# Patient Record
Sex: MALE | Race: WHITE | NOT HISPANIC OR LATINO | Employment: OTHER | URBAN - METROPOLITAN AREA
[De-identification: names, ages, dates, MRNs, and addresses within clinical notes are randomized per-mention and may not be internally consistent; named-entity substitution may affect disease eponyms.]

---

## 2017-02-06 ENCOUNTER — OFFICE VISIT (OUTPATIENT)
Dept: NEUROLOGY | Facility: CLINIC | Age: 67
End: 2017-02-06

## 2017-02-06 VITALS
HEART RATE: 83 BPM | WEIGHT: 120 LBS | HEIGHT: 70 IN | OXYGEN SATURATION: 94 % | DIASTOLIC BLOOD PRESSURE: 64 MMHG | SYSTOLIC BLOOD PRESSURE: 116 MMHG | BODY MASS INDEX: 17.18 KG/M2

## 2017-02-06 DIAGNOSIS — R56.9 GENERALIZED CONVULSIVE SEIZURES (HCC): ICD-10-CM

## 2017-02-06 DIAGNOSIS — G40.209 PARTIAL SYMPTOMATIC EPILEPSY WITH COMPLEX PARTIAL SEIZURES, NOT INTRACTABLE, WITHOUT STATUS EPILEPTICUS (HCC): Primary | ICD-10-CM

## 2017-02-06 PROCEDURE — 99213 OFFICE O/P EST LOW 20 MIN: CPT | Performed by: PSYCHIATRY & NEUROLOGY

## 2017-02-06 RX ORDER — WARFARIN SODIUM 4 MG/1
TABLET ORAL
COMMUNITY
Start: 2017-02-03 | End: 2018-02-06

## 2017-02-06 RX ORDER — PHENYTOIN SODIUM 100 MG/1
CAPSULE, EXTENDED RELEASE ORAL
Qty: 120 CAPSULE | Refills: 11 | Status: SHIPPED | OUTPATIENT
Start: 2017-02-06 | End: 2018-02-06 | Stop reason: SDUPTHER

## 2017-02-06 RX ORDER — MESALAMINE 1.2 G/1
TABLET, DELAYED RELEASE ORAL
COMMUNITY
Start: 2017-01-03

## 2017-02-06 RX ORDER — LEVOTHYROXINE SODIUM 0.05 MG/1
TABLET ORAL DAILY
COMMUNITY
Start: 2014-10-13

## 2017-02-06 NOTE — PROGRESS NOTES
Subjective:     Patient ID: Dmitri Alberto is a 66 y.o. male.    History of Present Illness  The following portions of the patient's history were reviewed and updated as appropriate: allergies, current medications, past family history, past medical history, past social history, past surgical history and problem list.  Epilepsy: In 1989 the patient had a seizure of unknown cause. The MRI brain scan was normal on November 17, 1989. He was started on Dilantin and did well. In retrospect he thought the seizure may have been due to excess use of marijuana and alcohol both of which she has subsequently avoided.     1998: In 1998 a seizure resulted in a shoulder fracture and head contusion with negative CT.     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     His last seizure was in 2012 at which time he had stopped taking Dilantin because he has been seizure free for a decade. Now sz free on phenytoin alternating 300 and 400/  Qod. When he took 400 daily, his level was toxic.      He is under the care of a hematologist and now is on coumadin daily for hypercoag state. Last level, Feb 2015 was 7.5 and free was 0.6    LAST OV-He did not want to switch off Dilantin even after review of potential AE> Discussed interaction of phenytoin and warfarin.     THIS OV-  Will ask dr wetzel to check phenytoin level with next INR>   Review of Systems   Constitutional: Positive for diaphoresis (night). Negative for chills and fever.   HENT: Negative.    Eyes: Negative.    Respiratory: Negative.    Cardiovascular: Negative.    Gastrointestinal: Negative.    Endocrine: Negative.    Genitourinary: Negative.    Musculoskeletal: Negative.    Skin: Negative.    Allergic/Immunologic: Negative.    Neurological: Positive for seizures.   Hematological:  Bruises/bleeds easily.   Psychiatric/Behavioral: Positive for agitation and confusion.        Objective:    Neurologic Exam     Cranial Nerves     CN III, IV, VI   Pupils are equal, round, and reactive to light.  Extraocular motions are normal.     Constitutional   General appearance: No acute distress, well appearing and well nourished. Thin  Head and Face   Head and face: Normal, atraumatic.   Eyes   Ophthalmoscopic examination: Normal appearing optic disc and posterior segments.Recent bilat cataract OR>    Neck   Neck and thyroid: Normal, supple, trachea midline, no masses or thyromegaly.   Cardiovascular   Peripheral vascular exam: Normal pulses throughout, no tenderness, erythema or swelling.   Musculoskeletal   Gait and station: Normal gait, stance and balance.   Muscle strength: Normal strength throughout arms and legs.   Muscle tone: No atrophy, abnormal movements, flaccidity, cogwheeling or spasticity.   Neurologic   Orientation to person, place, and time: Normal.   Recent and remote memory: Demonstrates normal memory for serial sevens, oorientation, spelling WORLD forward and backward, and recall 4/5 after 3 min..   Attention span and concentration: Normal thought process and attention span.   Language: Names objects, able to repeat phrases and speaks spontaneously.   Fund of knowledge: Normal vocabulary with appropriate knowledge of current events and past history.   Optic nerve: Normal.   3rd, 4th, and 6th cranial nerves: Normal.   5th cranial nerve: defer  7th cranial nerve: Normal.   8th cranial nerve: Normal.   9th cranial nerve: Normal.   10th cranial nerve: Normal.   11th cranial nerve: Normal.   12th cranial nerve: Normal.   Sensation: Normal. -vibration x4,   Reflexes: Normal in arms and legs. Grade 1 Coordination: Normal. F-N H-S  Cortical function: Normal.   Judgment and insight: Normal.   Mood and affect: Normal.   Physical Exam   Constitutional: He appears well-developed and well-nourished.    HENT:   Head: Normocephalic.   Eyes: EOM are normal. Pupils are equal, round, and reactive to light.   Neck: Normal range of motion.   Pulmonary/Chest: Effort normal.   Psychiatric: He has a normal mood and affect. His behavior is normal. Judgment and thought content normal.   Nursing note and vitals reviewed.      Assessment/Plan:       Problems Addressed this Visit        Unprioritized    Partial symptomatic epilepsy with complex partial seizures, not intractable, without status epilepticus - Primary             Doing well: No seizures, with low levels--so no change in dose.    Check phenytoin level with dr. Moody.  F/u one year.  Did not want to change meds as no AE. The memory sz after TBI after resolved.

## 2017-08-09 ENCOUNTER — OFFICE (AMBULATORY)
Dept: URBAN - METROPOLITAN AREA CLINIC 75 | Facility: CLINIC | Age: 67
End: 2017-08-09

## 2017-08-09 VITALS
SYSTOLIC BLOOD PRESSURE: 104 MMHG | HEART RATE: 62 BPM | DIASTOLIC BLOOD PRESSURE: 58 MMHG | HEIGHT: 70 IN | WEIGHT: 115 LBS

## 2017-08-09 DIAGNOSIS — K51.90 ULCERATIVE COLITIS, UNSPECIFIED, WITHOUT COMPLICATIONS: ICD-10-CM

## 2017-08-09 DIAGNOSIS — R63.4 ABNORMAL WEIGHT LOSS: ICD-10-CM

## 2017-08-09 PROCEDURE — 99213 OFFICE O/P EST LOW 20 MIN: CPT | Performed by: INTERNAL MEDICINE

## 2017-08-31 VITALS
SYSTOLIC BLOOD PRESSURE: 121 MMHG | SYSTOLIC BLOOD PRESSURE: 96 MMHG | SYSTOLIC BLOOD PRESSURE: 105 MMHG | OXYGEN SATURATION: 96 % | HEART RATE: 69 BPM | RESPIRATION RATE: 16 BRPM | HEART RATE: 63 BPM | DIASTOLIC BLOOD PRESSURE: 63 MMHG | TEMPERATURE: 97.7 F | HEART RATE: 68 BPM | WEIGHT: 115 LBS | RESPIRATION RATE: 48 BRPM | SYSTOLIC BLOOD PRESSURE: 101 MMHG | DIASTOLIC BLOOD PRESSURE: 71 MMHG | RESPIRATION RATE: 18 BRPM | DIASTOLIC BLOOD PRESSURE: 75 MMHG | DIASTOLIC BLOOD PRESSURE: 73 MMHG | SYSTOLIC BLOOD PRESSURE: 128 MMHG | HEART RATE: 78 BPM | RESPIRATION RATE: 24 BRPM | OXYGEN SATURATION: 95 % | SYSTOLIC BLOOD PRESSURE: 97 MMHG | RESPIRATION RATE: 14 BRPM | OXYGEN SATURATION: 97 % | HEART RATE: 74 BPM | RESPIRATION RATE: 13 BRPM | HEART RATE: 76 BPM | HEART RATE: 80 BPM | SYSTOLIC BLOOD PRESSURE: 103 MMHG | DIASTOLIC BLOOD PRESSURE: 74 MMHG | DIASTOLIC BLOOD PRESSURE: 66 MMHG | SYSTOLIC BLOOD PRESSURE: 114 MMHG | SYSTOLIC BLOOD PRESSURE: 102 MMHG | HEART RATE: 75 BPM | SYSTOLIC BLOOD PRESSURE: 117 MMHG | HEART RATE: 77 BPM | HEART RATE: 72 BPM | DIASTOLIC BLOOD PRESSURE: 64 MMHG | HEIGHT: 70 IN | SYSTOLIC BLOOD PRESSURE: 111 MMHG | RESPIRATION RATE: 15 BRPM | DIASTOLIC BLOOD PRESSURE: 65 MMHG | SYSTOLIC BLOOD PRESSURE: 98 MMHG | OXYGEN SATURATION: 94 %

## 2017-09-05 ENCOUNTER — AMBULATORY SURGICAL CENTER (AMBULATORY)
Dept: URBAN - METROPOLITAN AREA SURGERY 17 | Facility: SURGERY | Age: 67
End: 2017-09-05

## 2017-09-05 ENCOUNTER — OFFICE (AMBULATORY)
Dept: URBAN - METROPOLITAN AREA CLINIC 64 | Facility: CLINIC | Age: 67
End: 2017-09-05

## 2017-09-05 DIAGNOSIS — K20.9 ESOPHAGITIS, UNSPECIFIED: ICD-10-CM

## 2017-09-05 DIAGNOSIS — K51.90 ULCERATIVE COLITIS, UNSPECIFIED, WITHOUT COMPLICATIONS: ICD-10-CM

## 2017-09-05 DIAGNOSIS — K57.30 DIVERTICULOSIS OF LARGE INTESTINE WITHOUT PERFORATION OR ABS: ICD-10-CM

## 2017-09-05 DIAGNOSIS — K29.50 UNSPECIFIED CHRONIC GASTRITIS WITHOUT BLEEDING: ICD-10-CM

## 2017-09-05 DIAGNOSIS — K44.9 DIAPHRAGMATIC HERNIA WITHOUT OBSTRUCTION OR GANGRENE: ICD-10-CM

## 2017-09-05 DIAGNOSIS — K22.2 ESOPHAGEAL OBSTRUCTION: ICD-10-CM

## 2017-09-05 DIAGNOSIS — K52.9 NONINFECTIVE GASTROENTERITIS AND COLITIS, UNSPECIFIED: ICD-10-CM

## 2017-09-05 DIAGNOSIS — K22.70 BARRETT'S ESOPHAGUS WITHOUT DYSPLASIA: ICD-10-CM

## 2017-09-05 DIAGNOSIS — R63.4 ABNORMAL WEIGHT LOSS: ICD-10-CM

## 2017-09-05 LAB
GI HISTOLOGY: A. UNSPECIFIED: (no result)
GI HISTOLOGY: B. UNSPECIFIED: (no result)
GI HISTOLOGY: C. SELECT: (no result)
GI HISTOLOGY: D. UNSPECIFIED: (no result)
GI HISTOLOGY: E. SELECT: (no result)
GI HISTOLOGY: F. UNSPECIFIED: (no result)
GI HISTOLOGY: PDF REPORT: (no result)

## 2017-09-05 PROCEDURE — 88305 TISSUE EXAM BY PATHOLOGIST: CPT | Performed by: INTERNAL MEDICINE

## 2017-09-05 PROCEDURE — 45380 COLONOSCOPY AND BIOPSY: CPT | Mod: PT | Performed by: INTERNAL MEDICINE

## 2017-09-05 PROCEDURE — 43239 EGD BIOPSY SINGLE/MULTIPLE: CPT | Mod: 59 | Performed by: INTERNAL MEDICINE

## 2017-09-05 RX ADMIN — PROPOFOL 50 MG: 10 INJECTION, EMULSION INTRAVENOUS at 13:24

## 2017-09-05 RX ADMIN — PROPOFOL 50 MG: 10 INJECTION, EMULSION INTRAVENOUS at 13:10

## 2017-09-05 RX ADMIN — PROPOFOL 100 MG: 10 INJECTION, EMULSION INTRAVENOUS at 13:08

## 2017-09-05 RX ADMIN — PROPOFOL 50 MG: 10 INJECTION, EMULSION INTRAVENOUS at 13:30

## 2017-09-05 RX ADMIN — PROPOFOL 50 MG: 10 INJECTION, EMULSION INTRAVENOUS at 13:26

## 2017-10-17 ENCOUNTER — OFFICE (AMBULATORY)
Dept: URBAN - METROPOLITAN AREA CLINIC 75 | Facility: CLINIC | Age: 67
End: 2017-10-17

## 2017-10-17 VITALS
WEIGHT: 114 LBS | HEIGHT: 70 IN | HEART RATE: 72 BPM | SYSTOLIC BLOOD PRESSURE: 122 MMHG | DIASTOLIC BLOOD PRESSURE: 68 MMHG

## 2017-10-17 DIAGNOSIS — K51.90 ULCERATIVE COLITIS, UNSPECIFIED, WITHOUT COMPLICATIONS: ICD-10-CM

## 2017-10-17 DIAGNOSIS — K22.70 BARRETT'S ESOPHAGUS WITHOUT DYSPLASIA: ICD-10-CM

## 2017-10-17 PROCEDURE — 99213 OFFICE O/P EST LOW 20 MIN: CPT | Performed by: INTERNAL MEDICINE

## 2018-02-06 ENCOUNTER — OFFICE VISIT (OUTPATIENT)
Dept: NEUROLOGY | Facility: CLINIC | Age: 68
End: 2018-02-06

## 2018-02-06 VITALS
SYSTOLIC BLOOD PRESSURE: 104 MMHG | HEART RATE: 77 BPM | BODY MASS INDEX: 16.66 KG/M2 | HEIGHT: 70 IN | WEIGHT: 116.4 LBS | DIASTOLIC BLOOD PRESSURE: 64 MMHG | OXYGEN SATURATION: 95 %

## 2018-02-06 DIAGNOSIS — R41.89 ALTERATION IN COGNITION: ICD-10-CM

## 2018-02-06 DIAGNOSIS — T50.905D ADVERSE EFFECT OF DRUG, SUBSEQUENT ENCOUNTER: ICD-10-CM

## 2018-02-06 DIAGNOSIS — R56.9 GENERALIZED CONVULSIVE SEIZURES (HCC): ICD-10-CM

## 2018-02-06 DIAGNOSIS — G40.209 PARTIAL SYMPTOMATIC EPILEPSY WITH COMPLEX PARTIAL SEIZURES, NOT INTRACTABLE, WITHOUT STATUS EPILEPTICUS (HCC): Primary | ICD-10-CM

## 2018-02-06 PROCEDURE — 99214 OFFICE O/P EST MOD 30 MIN: CPT | Performed by: PSYCHIATRY & NEUROLOGY

## 2018-02-06 RX ORDER — HYDROXYZINE HYDROCHLORIDE 25 MG/1
25 TABLET, FILM COATED ORAL
COMMUNITY
Start: 2017-04-25

## 2018-02-06 RX ORDER — PHENYTOIN SODIUM 100 MG/1
CAPSULE, EXTENDED RELEASE ORAL
Qty: 120 CAPSULE | Refills: 11 | Status: SHIPPED | OUTPATIENT
Start: 2018-02-06 | End: 2019-02-05 | Stop reason: SDUPTHER

## 2018-02-06 RX ORDER — HYDROMORPHONE HYDROCHLORIDE 2 MG/1
TABLET ORAL
COMMUNITY
Start: 2017-11-14 | End: 2018-02-06

## 2018-02-06 RX ORDER — CEPHALEXIN 500 MG/1
CAPSULE ORAL
COMMUNITY
Start: 2017-11-30 | End: 2018-02-06

## 2018-02-06 RX ORDER — MESALAMINE 400 MG/1
400 CAPSULE, DELAYED RELEASE ORAL
COMMUNITY
End: 2018-02-06

## 2018-02-06 RX ORDER — OXYCODONE AND ACETAMINOPHEN 10; 325 MG/1; MG/1
TABLET ORAL
COMMUNITY
Start: 2018-01-05 | End: 2018-02-06

## 2018-02-06 RX ORDER — HYDROCODONE BITARTRATE AND ACETAMINOPHEN 5; 325 MG/1; MG/1
TABLET ORAL
COMMUNITY
Start: 2017-11-17 | End: 2018-02-06

## 2018-02-06 RX ORDER — PHENYTOIN 50 MG/1
100 TABLET, CHEWABLE ORAL
Status: ON HOLD | COMMUNITY
End: 2018-07-30

## 2018-02-06 RX ORDER — HYDROCODONE BITARTRATE AND ACETAMINOPHEN 7.5; 325 MG/1; MG/1
TABLET ORAL
COMMUNITY
Start: 2017-11-30 | End: 2018-02-06

## 2018-02-06 NOTE — PROGRESS NOTES
Subjective:     Patient ID: Dmitri Alberto is a 67 y.o. male.    History of Present Illness  The following portions of the patient's history were reviewed and updated as appropriate: allergies, current medications, past family history, past medical history, past social history, past surgical history and problem list.  EPILEPSY- problem list.  Epilepsy: In 1989 the patient had a seizure of unknown cause. The MRI brain scan was normal on November 17, 1989. He was started on Dilantin and did well. In retrospect he thought the seizure may have been due to excess use of marijuana and alcohol both of which she has subsequently avoided.      1998: In 1998 a seizure resulted in a shoulder fracture and head contusion with negative CT.      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      His last seizure was in 2012 at which time he had stopped taking Dilantin because he has been seizure free for a decade. Now sz free on phenytoin alternating 300 and 400/  Qod. When he took 400 daily, his level was toxic.     On 11/21/17 lab review shows dilantion level is 10.5     DVT hx- no longer on warfarin b/c of bleeding episode.  Interaction with phenytoin no longer an issue    Cognitive change-his wife has noted that Mr. Alberto has been confused as to the date and office appointments..  No headache blurred vision stroke.  Symptoms of slow onset.  None of this reported to the PCP in the past        Adverse Effects- was on shots for bone health.  Now on Mg and calcium and isma d 3.   Review of Systems   Constitutional: Negative for activity change, appetite change and fatigue.   HENT: Negative for ear pain, facial swelling and trouble swallowing.    Eyes: Negative for photophobia, pain and visual disturbance.   Respiratory: Negative for  choking, chest tightness and shortness of breath.    Cardiovascular: Negative for chest pain, palpitations and leg swelling.   Gastrointestinal: Negative for abdominal pain, constipation and nausea.   Endocrine: Negative for polydipsia, polyphagia and polyuria.   Genitourinary: Positive for frequency. Negative for urgency.   Musculoskeletal: Negative for back pain, gait problem and neck pain.   Skin: Negative for color change, rash and wound.   Allergic/Immunologic: Negative for environmental allergies, food allergies and immunocompromised state.   Neurological: Negative for dizziness, tremors, seizures, syncope, facial asymmetry, speech difficulty, weakness, light-headedness, numbness and headaches.   Hematological: Negative for adenopathy. Does not bruise/bleed easily.   Psychiatric/Behavioral: Negative for agitation, behavioral problems, confusion, decreased concentration, dysphoric mood, hallucinations, self-injury, sleep disturbance and suicidal ideas. The patient is not nervous/anxious and is not hyperactive.         Objective:    Neurologic Exam     Mental Status   Oriented to person, place, and time.   Oriented to time. Disoriented to day. Oriented to year.        [place] cognitive assessment-he scored 20 out of 30,  Losing points for short-term memory, repeating backwards language and orientation concerning date place and day.     Cranial Nerves   Cranial nerves II through XII intact.     CN III, IV, VI   Pupils are equal, round, and reactive to light.    Motor Exam   Muscle bulk: normal  Overall muscle tone: normal    Sensory Exam   Light touch normal.   Vibration normal.     Gait, Coordination, and Reflexes     Gait  Gait: normal    Coordination   Romberg: negative  Finger to nose coordination: normal    Tremor   Resting tremor: absent  Intention tremor: absent  Action tremor: absent    Reflexes   Right patellar: 2+  Left patellar: 2+  Right achilles: 2+  Left achilles: 2+       Physical Exam    Constitutional: He is oriented to person, place, and time. He appears well-developed and well-nourished.   Eyes: Pupils are equal, round, and reactive to light.   Neck: Normal range of motion. Neck supple.   Cardiovascular: Normal rate.    Pulmonary/Chest: Effort normal.   Neurological: He is oriented to person, place, and time. He has a normal Finger-Nose-Finger Test and a normal Romberg Test. Gait normal.   Reflex Scores:       Patellar reflexes are 2+ on the right side and 2+ on the left side.       Achilles reflexes are 2+ on the right side and 2+ on the left side.  Psychiatric: He has a normal mood and affect. His behavior is normal.   Abnormal thought content and judgment-see results of [place] testing   Nursing note and vitals reviewed.      Assessment/Plan:       Problems Addressed this Visit        Unprioritized    Adverse drug effect    Partial symptomatic epilepsy with complex partial seizures, not intractable, without status epilepticus - Primary    Relevant Medications    phenytoin (DILANTIN) 100 MG ER capsule    phenytoin (DILANTIN) 50 MG chewable tablet    Generalized convulsive seizures    Relevant Medications    phenytoin (DILANTIN) 100 MG ER capsule    Alteration in cognition    Relevant Orders    Ambulatory Referral to Neuropsychology (Completed)           Epilepsy control is good.  No change in medication at patient's request.  Blood level therapeutic a few months ago as noted.    Altered cognition-poor results on [place] testing.  Not sure if this is anxiety over testing or not so will have him get a neuropsychology test.    Adverse drug effect-discussed above and stable

## 2018-03-13 ENCOUNTER — TREATMENT (OUTPATIENT)
Dept: NEUROLOGY | Facility: CLINIC | Age: 68
End: 2018-03-13

## 2018-03-13 NOTE — PROGRESS NOTES
A review of the neuropsychology test report dated fibular 20/8/2018 concluded a diagnosis of frontotemporal degeneration, behavioral variant, mild he had borderline scores on semantic verbal fluency.  Visual memory was strong without deficit.    It is noted that cognitive decline could be related to past head injury but more likely the personality problems would be more consistent with FTD    He will look for evidence of possible frontal atrophy on brain MRI which has been scheduled.

## 2018-07-29 ENCOUNTER — HOSPITAL ENCOUNTER (INPATIENT)
Facility: HOSPITAL | Age: 68
LOS: 2 days | Discharge: HOME | End: 2018-08-01
Attending: EMERGENCY MEDICINE | Admitting: INTERNAL MEDICINE

## 2018-07-29 ENCOUNTER — APPOINTMENT (OUTPATIENT)
Dept: GENERAL RADIOLOGY | Facility: HOSPITAL | Age: 68
End: 2018-07-29

## 2018-07-29 DIAGNOSIS — L03.113 CELLULITIS OF RIGHT ELBOW: Primary | ICD-10-CM

## 2018-07-29 DIAGNOSIS — M25.421 EFFUSION OF RIGHT ELBOW: ICD-10-CM

## 2018-07-29 LAB
ANION GAP SERPL CALCULATED.3IONS-SCNC: 10.1 MMOL/L
BASOPHILS # BLD AUTO: 0.04 10*3/MM3 (ref 0–0.2)
BASOPHILS NFR BLD AUTO: 0.4 % (ref 0–1.5)
BUN BLD-MCNC: 21 MG/DL (ref 8–23)
BUN/CREAT SERPL: 22.8 (ref 7–25)
CALCIUM SPEC-SCNC: 9 MG/DL (ref 8.6–10.5)
CHLORIDE SERPL-SCNC: 98 MMOL/L (ref 98–107)
CO2 SERPL-SCNC: 31.9 MMOL/L (ref 22–29)
CREAT BLD-MCNC: 0.92 MG/DL (ref 0.76–1.27)
CRP SERPL-MCNC: 28.66 MG/DL (ref 0–0.5)
DEPRECATED RDW RBC AUTO: 47.4 FL (ref 37–54)
EOSINOPHIL # BLD AUTO: 0.13 10*3/MM3 (ref 0–0.7)
EOSINOPHIL NFR BLD AUTO: 1.4 % (ref 0.3–6.2)
ERYTHROCYTE [DISTWIDTH] IN BLOOD BY AUTOMATED COUNT: 12.9 % (ref 11.5–14.5)
ERYTHROCYTE [SEDIMENTATION RATE] IN BLOOD: 64 MM/HR (ref 0–20)
GFR SERPL CREATININE-BSD FRML MDRD: 82 ML/MIN/1.73
GLUCOSE BLD-MCNC: 151 MG/DL (ref 65–99)
HCT VFR BLD AUTO: 40.9 % (ref 40.4–52.2)
HGB BLD-MCNC: 13.3 G/DL (ref 13.7–17.6)
IMM GRANULOCYTES # BLD: 0.02 10*3/MM3 (ref 0–0.03)
IMM GRANULOCYTES NFR BLD: 0.2 % (ref 0–0.5)
LYMPHOCYTES # BLD AUTO: 1.5 10*3/MM3 (ref 0.9–4.8)
LYMPHOCYTES NFR BLD AUTO: 16.6 % (ref 19.6–45.3)
MCH RBC QN AUTO: 32.6 PG (ref 27–32.7)
MCHC RBC AUTO-ENTMCNC: 32.5 G/DL (ref 32.6–36.4)
MCV RBC AUTO: 100.2 FL (ref 79.8–96.2)
MONOCYTES # BLD AUTO: 1.05 10*3/MM3 (ref 0.2–1.2)
MONOCYTES NFR BLD AUTO: 11.6 % (ref 5–12)
NEUTROPHILS # BLD AUTO: 6.31 10*3/MM3 (ref 1.9–8.1)
NEUTROPHILS NFR BLD AUTO: 70 % (ref 42.7–76)
PLATELET # BLD AUTO: 256 10*3/MM3 (ref 140–500)
PMV BLD AUTO: 9.6 FL (ref 6–12)
POTASSIUM BLD-SCNC: 4.2 MMOL/L (ref 3.5–5.2)
RBC # BLD AUTO: 4.08 10*6/MM3 (ref 4.6–6)
SODIUM BLD-SCNC: 140 MMOL/L (ref 136–145)
WBC NRBC COR # BLD: 9.03 10*3/MM3 (ref 4.5–10.7)

## 2018-07-29 PROCEDURE — 99284 EMERGENCY DEPT VISIT MOD MDM: CPT

## 2018-07-29 PROCEDURE — 84550 ASSAY OF BLOOD/URIC ACID: CPT | Performed by: PHYSICIAN ASSISTANT

## 2018-07-29 PROCEDURE — 86140 C-REACTIVE PROTEIN: CPT | Performed by: PHYSICIAN ASSISTANT

## 2018-07-29 PROCEDURE — 85652 RBC SED RATE AUTOMATED: CPT | Performed by: PHYSICIAN ASSISTANT

## 2018-07-29 PROCEDURE — 80048 BASIC METABOLIC PNL TOTAL CA: CPT | Performed by: PHYSICIAN ASSISTANT

## 2018-07-29 PROCEDURE — 73070 X-RAY EXAM OF ELBOW: CPT

## 2018-07-29 PROCEDURE — 85025 COMPLETE CBC W/AUTO DIFF WBC: CPT | Performed by: PHYSICIAN ASSISTANT

## 2018-07-29 PROCEDURE — 87040 BLOOD CULTURE FOR BACTERIA: CPT | Performed by: PHYSICIAN ASSISTANT

## 2018-07-29 RX ORDER — OXYCODONE HYDROCHLORIDE AND ACETAMINOPHEN 5; 325 MG/1; MG/1
1 TABLET ORAL ONCE
Status: COMPLETED | OUTPATIENT
Start: 2018-07-29 | End: 2018-07-29

## 2018-07-29 RX ADMIN — OXYCODONE HYDROCHLORIDE AND ACETAMINOPHEN 1 TABLET: 5; 325 TABLET ORAL at 23:41

## 2018-07-30 ENCOUNTER — APPOINTMENT (OUTPATIENT)
Dept: MRI IMAGING | Facility: HOSPITAL | Age: 68
End: 2018-07-30
Attending: ORTHOPAEDIC SURGERY

## 2018-07-30 ENCOUNTER — APPOINTMENT (OUTPATIENT)
Dept: GENERAL RADIOLOGY | Facility: HOSPITAL | Age: 68
End: 2018-07-30
Attending: ORTHOPAEDIC SURGERY

## 2018-07-30 PROBLEM — E83.110 HEREDITARY HEMOCHROMATOSIS (HCC): Status: ACTIVE | Noted: 2018-07-30

## 2018-07-30 PROBLEM — E03.9 HYPOTHYROIDISM: Status: ACTIVE | Noted: 2018-07-30

## 2018-07-30 PROBLEM — L03.113 CELLULITIS OF RIGHT ELBOW: Status: ACTIVE | Noted: 2018-07-30

## 2018-07-30 LAB
ALBUMIN SERPL-MCNC: 3.1 G/DL (ref 3.5–5.2)
ALBUMIN/GLOB SERPL: 0.8 G/DL
ALP SERPL-CCNC: 59 U/L (ref 39–117)
ALT SERPL W P-5'-P-CCNC: 7 U/L (ref 1–41)
ANION GAP SERPL CALCULATED.3IONS-SCNC: 11.5 MMOL/L
APPEARANCE FLD: ABNORMAL
AST SERPL-CCNC: 12 U/L (ref 1–40)
BASOPHILS # BLD AUTO: 0.03 10*3/MM3 (ref 0–0.2)
BASOPHILS NFR BLD AUTO: 0.4 % (ref 0–1.5)
BILIRUB SERPL-MCNC: 0.3 MG/DL (ref 0.1–1.2)
BUN BLD-MCNC: 15 MG/DL (ref 8–23)
BUN/CREAT SERPL: 22.4 (ref 7–25)
CALCIUM SPEC-SCNC: 8.2 MG/DL (ref 8.6–10.5)
CHLORIDE SERPL-SCNC: 100 MMOL/L (ref 98–107)
CO2 SERPL-SCNC: 25.5 MMOL/L (ref 22–29)
COLOR FLD: YELLOW
CREAT BLD-MCNC: 0.67 MG/DL (ref 0.76–1.27)
CRYSTALS FLD MICRO: NORMAL
D-LACTATE SERPL-SCNC: 1.2 MMOL/L (ref 0.5–2)
DEPRECATED RDW RBC AUTO: 46.9 FL (ref 37–54)
EOSINOPHIL # BLD AUTO: 0.16 10*3/MM3 (ref 0–0.7)
EOSINOPHIL NFR BLD AUTO: 2.2 % (ref 0.3–6.2)
ERYTHROCYTE [DISTWIDTH] IN BLOOD BY AUTOMATED COUNT: 12.8 % (ref 11.5–14.5)
GFR SERPL CREATININE-BSD FRML MDRD: 118 ML/MIN/1.73
GLOBULIN UR ELPH-MCNC: 4.1 GM/DL
GLUCOSE BLD-MCNC: 107 MG/DL (ref 65–99)
HCT VFR BLD AUTO: 38.4 % (ref 40.4–52.2)
HGB BLD-MCNC: 12.1 G/DL (ref 13.7–17.6)
IMM GRANULOCYTES # BLD: 0.02 10*3/MM3 (ref 0–0.03)
IMM GRANULOCYTES NFR BLD: 0.3 % (ref 0–0.5)
LYMPHOCYTES # BLD AUTO: 1.55 10*3/MM3 (ref 0.9–4.8)
LYMPHOCYTES NFR BLD AUTO: 21 % (ref 19.6–45.3)
LYMPHOCYTES NFR FLD MANUAL: 7 %
MCH RBC QN AUTO: 31.6 PG (ref 27–32.7)
MCHC RBC AUTO-ENTMCNC: 31.5 G/DL (ref 32.6–36.4)
MCV RBC AUTO: 100.3 FL (ref 79.8–96.2)
MONOCYTES # BLD AUTO: 1.05 10*3/MM3 (ref 0.2–1.2)
MONOCYTES NFR BLD AUTO: 14.2 % (ref 5–12)
NEUTROPHILS # BLD AUTO: 4.57 10*3/MM3 (ref 1.9–8.1)
NEUTROPHILS NFR BLD AUTO: 61.9 % (ref 42.7–76)
NEUTROPHILS NFR FLD MANUAL: 93 %
PLATELET # BLD AUTO: 226 10*3/MM3 (ref 140–500)
PMV BLD AUTO: 9.6 FL (ref 6–12)
POTASSIUM BLD-SCNC: 3.6 MMOL/L (ref 3.5–5.2)
PROT SERPL-MCNC: 7.2 G/DL (ref 6–8.5)
RBC # BLD AUTO: 3.83 10*6/MM3 (ref 4.6–6)
RBC # FLD AUTO: 72 /MM3
SODIUM BLD-SCNC: 137 MMOL/L (ref 136–145)
URATE SERPL-MCNC: 2.8 MG/DL (ref 3.4–7)
WBC # FLD: ABNORMAL /MM3
WBC NRBC COR # BLD: 7.38 10*3/MM3 (ref 4.5–10.7)

## 2018-07-30 PROCEDURE — 99222 1ST HOSP IP/OBS MODERATE 55: CPT | Performed by: ORTHOPAEDIC SURGERY

## 2018-07-30 PROCEDURE — 0 GADOBENATE DIMEGLUMINE 529 MG/ML SOLUTION: Performed by: INTERNAL MEDICINE

## 2018-07-30 PROCEDURE — 89060 EXAM SYNOVIAL FLUID CRYSTALS: CPT | Performed by: ORTHOPAEDIC SURGERY

## 2018-07-30 PROCEDURE — A9577 INJ MULTIHANCE: HCPCS | Performed by: INTERNAL MEDICINE

## 2018-07-30 PROCEDURE — 77002 NEEDLE LOCALIZATION BY XRAY: CPT

## 2018-07-30 PROCEDURE — 25010000002 VANCOMYCIN PER 500 MG: Performed by: NURSE PRACTITIONER

## 2018-07-30 PROCEDURE — 87205 SMEAR GRAM STAIN: CPT | Performed by: ORTHOPAEDIC SURGERY

## 2018-07-30 PROCEDURE — 85025 COMPLETE CBC W/AUTO DIFF WBC: CPT | Performed by: NURSE PRACTITIONER

## 2018-07-30 PROCEDURE — 99232 SBSQ HOSP IP/OBS MODERATE 35: CPT | Performed by: ORTHOPAEDIC SURGERY

## 2018-07-30 PROCEDURE — 83605 ASSAY OF LACTIC ACID: CPT | Performed by: PHYSICIAN ASSISTANT

## 2018-07-30 PROCEDURE — 25010000002 VANCOMYCIN 750 MG RECONSTITUTED SOLUTION: Performed by: NURSE PRACTITIONER

## 2018-07-30 PROCEDURE — 63710000001 PREDNISONE PER 5 MG: Performed by: INTERNAL MEDICINE

## 2018-07-30 PROCEDURE — 89051 BODY FLUID CELL COUNT: CPT | Performed by: ORTHOPAEDIC SURGERY

## 2018-07-30 PROCEDURE — 73223 MRI JOINT UPR EXTR W/O&W/DYE: CPT

## 2018-07-30 PROCEDURE — 87070 CULTURE OTHR SPECIMN AEROBIC: CPT | Performed by: ORTHOPAEDIC SURGERY

## 2018-07-30 PROCEDURE — 80053 COMPREHEN METABOLIC PANEL: CPT | Performed by: NURSE PRACTITIONER

## 2018-07-30 PROCEDURE — 0R9L3ZX DRAINAGE OF RIGHT ELBOW JOINT, PERCUTANEOUS APPROACH, DIAGNOSTIC: ICD-10-PCS | Performed by: RADIOLOGY

## 2018-07-30 PROCEDURE — 87015 SPECIMEN INFECT AGNT CONCNTJ: CPT | Performed by: ORTHOPAEDIC SURGERY

## 2018-07-30 PROCEDURE — 25010000002 PIPERACILLIN SOD-TAZOBACTAM PER 1 G: Performed by: PHYSICIAN ASSISTANT

## 2018-07-30 RX ORDER — SODIUM CHLORIDE 0.9 % (FLUSH) 0.9 %
1-10 SYRINGE (ML) INJECTION AS NEEDED
Status: DISCONTINUED | OUTPATIENT
Start: 2018-07-30 | End: 2018-08-01 | Stop reason: HOSPADM

## 2018-07-30 RX ORDER — LIDOCAINE HYDROCHLORIDE 10 MG/ML
10 INJECTION, SOLUTION INFILTRATION; PERINEURAL ONCE
Status: COMPLETED | OUTPATIENT
Start: 2018-07-30 | End: 2018-07-30

## 2018-07-30 RX ORDER — LIDOCAINE HYDROCHLORIDE AND EPINEPHRINE 10; 10 MG/ML; UG/ML
10 INJECTION, SOLUTION INFILTRATION; PERINEURAL ONCE
Status: DISCONTINUED | OUTPATIENT
Start: 2018-07-30 | End: 2018-07-30

## 2018-07-30 RX ORDER — ONDANSETRON 2 MG/ML
4 INJECTION INTRAMUSCULAR; INTRAVENOUS EVERY 6 HOURS PRN
Status: DISCONTINUED | OUTPATIENT
Start: 2018-07-30 | End: 2018-08-01 | Stop reason: HOSPADM

## 2018-07-30 RX ORDER — VANCOMYCIN HYDROCHLORIDE 1 G/200ML
1000 INJECTION, SOLUTION INTRAVENOUS ONCE
Status: COMPLETED | OUTPATIENT
Start: 2018-07-30 | End: 2018-07-30

## 2018-07-30 RX ORDER — ACETAMINOPHEN 325 MG/1
650 TABLET ORAL EVERY 4 HOURS PRN
Status: DISCONTINUED | OUTPATIENT
Start: 2018-07-30 | End: 2018-08-01 | Stop reason: HOSPADM

## 2018-07-30 RX ORDER — LEVOTHYROXINE SODIUM 0.05 MG/1
50 TABLET ORAL
Status: DISCONTINUED | OUTPATIENT
Start: 2018-07-30 | End: 2018-08-01 | Stop reason: HOSPADM

## 2018-07-30 RX ORDER — PHENYTOIN SODIUM 100 MG/1
200 CAPSULE, EXTENDED RELEASE ORAL EVERY 12 HOURS SCHEDULED
Status: DISCONTINUED | OUTPATIENT
Start: 2018-07-30 | End: 2018-08-01 | Stop reason: HOSPADM

## 2018-07-30 RX ORDER — MESALAMINE 1.2 G/1
1.2 TABLET, DELAYED RELEASE ORAL
Status: DISCONTINUED | OUTPATIENT
Start: 2018-07-30 | End: 2018-08-01 | Stop reason: HOSPADM

## 2018-07-30 RX ORDER — OXYCODONE HYDROCHLORIDE AND ACETAMINOPHEN 5; 325 MG/1; MG/1
1 TABLET ORAL EVERY 4 HOURS PRN
Status: DISCONTINUED | OUTPATIENT
Start: 2018-07-30 | End: 2018-08-01 | Stop reason: HOSPADM

## 2018-07-30 RX ORDER — ONDANSETRON 4 MG/1
4 TABLET, FILM COATED ORAL EVERY 6 HOURS PRN
Status: DISCONTINUED | OUTPATIENT
Start: 2018-07-30 | End: 2018-08-01 | Stop reason: HOSPADM

## 2018-07-30 RX ORDER — SODIUM CHLORIDE 9 MG/ML
50 INJECTION, SOLUTION INTRAVENOUS CONTINUOUS
Status: DISCONTINUED | OUTPATIENT
Start: 2018-07-30 | End: 2018-08-01 | Stop reason: HOSPADM

## 2018-07-30 RX ORDER — ONDANSETRON 4 MG/1
4 TABLET, ORALLY DISINTEGRATING ORAL EVERY 6 HOURS PRN
Status: DISCONTINUED | OUTPATIENT
Start: 2018-07-30 | End: 2018-08-01 | Stop reason: HOSPADM

## 2018-07-30 RX ORDER — VANCOMYCIN HYDROCHLORIDE 1 G/200ML
20 INJECTION, SOLUTION INTRAVENOUS ONCE
Status: DISCONTINUED | OUTPATIENT
Start: 2018-07-30 | End: 2018-07-30

## 2018-07-30 RX ORDER — HYDROXYZINE HYDROCHLORIDE 25 MG/1
25 TABLET, FILM COATED ORAL 3 TIMES DAILY PRN
Status: DISCONTINUED | OUTPATIENT
Start: 2018-07-30 | End: 2018-08-01 | Stop reason: HOSPADM

## 2018-07-30 RX ADMIN — PREDNISONE 60 MG: 50 TABLET ORAL at 14:58

## 2018-07-30 RX ADMIN — VANCOMYCIN HYDROCHLORIDE 750 MG: 750 INJECTION, POWDER, LYOPHILIZED, FOR SOLUTION INTRAVENOUS at 16:57

## 2018-07-30 RX ADMIN — PHENYTOIN SODIUM 200 MG: 100 CAPSULE, EXTENDED RELEASE ORAL at 13:13

## 2018-07-30 RX ADMIN — LIDOCAINE HYDROCHLORIDE 10 ML: 10 INJECTION, SOLUTION INFILTRATION; PERINEURAL at 13:45

## 2018-07-30 RX ADMIN — OXYCODONE HYDROCHLORIDE AND ACETAMINOPHEN 1 TABLET: 5; 325 TABLET ORAL at 12:23

## 2018-07-30 RX ADMIN — SODIUM CHLORIDE 50 ML/HR: 9 INJECTION, SOLUTION INTRAVENOUS at 04:36

## 2018-07-30 RX ADMIN — OXYCODONE HYDROCHLORIDE AND ACETAMINOPHEN 1 TABLET: 5; 325 TABLET ORAL at 22:42

## 2018-07-30 RX ADMIN — OXYCODONE HYDROCHLORIDE AND ACETAMINOPHEN 1 TABLET: 5; 325 TABLET ORAL at 17:02

## 2018-07-30 RX ADMIN — LEVOTHYROXINE SODIUM 50 MCG: 50 TABLET ORAL at 13:12

## 2018-07-30 RX ADMIN — GADOBENATE DIMEGLUMINE 10 ML: 529 INJECTION, SOLUTION INTRAVENOUS at 10:54

## 2018-07-30 RX ADMIN — PHENYTOIN SODIUM 200 MG: 100 CAPSULE, EXTENDED RELEASE ORAL at 20:04

## 2018-07-30 RX ADMIN — VANCOMYCIN HYDROCHLORIDE 1000 MG: 1 INJECTION, SOLUTION INTRAVENOUS at 04:37

## 2018-07-30 RX ADMIN — MESALAMINE 1.2 G: 1.2 TABLET, DELAYED RELEASE ORAL at 13:13

## 2018-07-30 RX ADMIN — OXYCODONE HYDROCHLORIDE AND ACETAMINOPHEN 1 TABLET: 5; 325 TABLET ORAL at 03:57

## 2018-07-30 RX ADMIN — TAZOBACTAM SODIUM AND PIPERACILLIN SODIUM 4.5 G: 500; 4 INJECTION, SOLUTION INTRAVENOUS at 01:06

## 2018-07-31 PROBLEM — M11.221: Status: ACTIVE | Noted: 2018-07-31

## 2018-07-31 PROBLEM — E43 SEVERE MALNUTRITION (HCC): Status: ACTIVE | Noted: 2018-07-31

## 2018-07-31 PROCEDURE — 63710000001 PREDNISONE PER 1 MG: Performed by: INTERNAL MEDICINE

## 2018-07-31 PROCEDURE — 99231 SBSQ HOSP IP/OBS SF/LOW 25: CPT | Performed by: ORTHOPAEDIC SURGERY

## 2018-07-31 PROCEDURE — 25010000002 VANCOMYCIN 750 MG RECONSTITUTED SOLUTION: Performed by: NURSE PRACTITIONER

## 2018-07-31 RX ORDER — PREDNISONE 10 MG/1
10 TABLET ORAL DAILY
Status: DISCONTINUED | OUTPATIENT
Start: 2018-08-06 | End: 2018-08-01 | Stop reason: HOSPADM

## 2018-07-31 RX ORDER — PREDNISONE 1 MG/1
5 TABLET ORAL DAILY
Status: DISCONTINUED | OUTPATIENT
Start: 2018-08-08 | End: 2018-08-01 | Stop reason: HOSPADM

## 2018-07-31 RX ORDER — PREDNISONE 20 MG/1
40 TABLET ORAL DAILY
Status: COMPLETED | OUTPATIENT
Start: 2018-07-31 | End: 2018-08-01

## 2018-07-31 RX ORDER — PREDNISONE 1 MG/1
5 TABLET ORAL
Status: DISCONTINUED | OUTPATIENT
Start: 2018-07-31 | End: 2018-07-31

## 2018-07-31 RX ORDER — PREDNISONE 20 MG/1
20 TABLET ORAL DAILY
Status: DISCONTINUED | OUTPATIENT
Start: 2018-08-04 | End: 2018-08-01 | Stop reason: HOSPADM

## 2018-07-31 RX ADMIN — LEVOTHYROXINE SODIUM 50 MCG: 50 TABLET ORAL at 07:43

## 2018-07-31 RX ADMIN — PREDNISONE 40 MG: 20 TABLET ORAL at 11:07

## 2018-07-31 RX ADMIN — PHENYTOIN SODIUM 200 MG: 100 CAPSULE, EXTENDED RELEASE ORAL at 08:34

## 2018-07-31 RX ADMIN — PHENYTOIN SODIUM 200 MG: 100 CAPSULE, EXTENDED RELEASE ORAL at 20:36

## 2018-07-31 RX ADMIN — MESALAMINE 1.2 G: 1.2 TABLET, DELAYED RELEASE ORAL at 08:34

## 2018-07-31 RX ADMIN — SODIUM CHLORIDE 50 ML/HR: 9 INJECTION, SOLUTION INTRAVENOUS at 10:06

## 2018-07-31 RX ADMIN — OXYCODONE HYDROCHLORIDE AND ACETAMINOPHEN 1 TABLET: 5; 325 TABLET ORAL at 04:18

## 2018-07-31 RX ADMIN — OXYCODONE HYDROCHLORIDE AND ACETAMINOPHEN 1 TABLET: 5; 325 TABLET ORAL at 14:11

## 2018-07-31 RX ADMIN — OXYCODONE HYDROCHLORIDE AND ACETAMINOPHEN 1 TABLET: 5; 325 TABLET ORAL at 08:34

## 2018-07-31 RX ADMIN — OXYCODONE HYDROCHLORIDE AND ACETAMINOPHEN 1 TABLET: 5; 325 TABLET ORAL at 20:36

## 2018-07-31 RX ADMIN — VANCOMYCIN HYDROCHLORIDE 750 MG: 750 INJECTION, POWDER, LYOPHILIZED, FOR SOLUTION INTRAVENOUS at 04:18

## 2018-08-01 VITALS
HEART RATE: 87 BPM | OXYGEN SATURATION: 94 % | HEIGHT: 69 IN | WEIGHT: 110.01 LBS | SYSTOLIC BLOOD PRESSURE: 114 MMHG | BODY MASS INDEX: 16.29 KG/M2 | TEMPERATURE: 97.5 F | RESPIRATION RATE: 16 BRPM | DIASTOLIC BLOOD PRESSURE: 64 MMHG

## 2018-08-01 PROCEDURE — 99231 SBSQ HOSP IP/OBS SF/LOW 25: CPT | Performed by: ORTHOPAEDIC SURGERY

## 2018-08-01 PROCEDURE — 63710000001 PREDNISONE PER 1 MG: Performed by: INTERNAL MEDICINE

## 2018-08-01 RX ORDER — OXYCODONE HYDROCHLORIDE AND ACETAMINOPHEN 5; 325 MG/1; MG/1
1 TABLET ORAL EVERY 6 HOURS PRN
Qty: 20 TABLET | Refills: 0 | Status: SHIPPED | OUTPATIENT
Start: 2018-08-01 | End: 2018-08-09

## 2018-08-01 RX ORDER — ACETAMINOPHEN 325 MG/1
650 TABLET ORAL EVERY 4 HOURS PRN
Start: 2018-08-01 | End: 2019-02-05

## 2018-08-01 RX ORDER — PREDNISONE 10 MG/1
30 TABLET ORAL DAILY
Qty: 6 TABLET | Refills: 0 | Status: SHIPPED | OUTPATIENT
Start: 2018-08-02 | End: 2018-08-04

## 2018-08-01 RX ORDER — PREDNISONE 10 MG/1
TABLET ORAL
Qty: 30 TABLET | Refills: 0 | Status: SHIPPED | OUTPATIENT
Start: 2018-08-01 | End: 2018-10-22

## 2018-08-01 RX ADMIN — LEVOTHYROXINE SODIUM 50 MCG: 50 TABLET ORAL at 07:44

## 2018-08-01 RX ADMIN — OXYCODONE HYDROCHLORIDE AND ACETAMINOPHEN 1 TABLET: 5; 325 TABLET ORAL at 08:36

## 2018-08-01 RX ADMIN — MESALAMINE 1.2 G: 1.2 TABLET, DELAYED RELEASE ORAL at 08:36

## 2018-08-01 RX ADMIN — SODIUM CHLORIDE 50 ML/HR: 9 INJECTION, SOLUTION INTRAVENOUS at 05:18

## 2018-08-01 RX ADMIN — OXYCODONE HYDROCHLORIDE AND ACETAMINOPHEN 1 TABLET: 5; 325 TABLET ORAL at 01:04

## 2018-08-01 RX ADMIN — PREDNISONE 40 MG: 20 TABLET ORAL at 08:36

## 2018-08-01 RX ADMIN — OXYCODONE HYDROCHLORIDE AND ACETAMINOPHEN 1 TABLET: 5; 325 TABLET ORAL at 12:57

## 2018-08-01 RX ADMIN — PHENYTOIN SODIUM 200 MG: 100 CAPSULE, EXTENDED RELEASE ORAL at 08:36

## 2018-08-01 NOTE — PROGRESS NOTES
"  Infectious Diseases Progress Note    Sandee Navarro MD     [facility]  Los: 2 days  Patient Identification:  Name: Dmitri Alberto  Age: 68 y.o.  Sex: male  :  1950  MRN: [id number]         Primary Care Physician: Pranay Sherwood            Subjective: doing better  Interval History: See consultation note.     Objective:    Scheduled Meds:    levothyroxine 50 mcg Oral Q AM   mesalamine 1.2 g Oral Daily With Breakfast   phenytoin 200 mg Oral Q12H   predniSONE 40 mg Oral Daily   Followed by      [START ON 2018] predniSONE 30 mg Oral Daily   Followed by      [START ON 2018] predniSONE 20 mg Oral Daily   Followed by      [START ON 2018] predniSONE 10 mg Oral Daily   Followed by      [START ON 2018] predniSONE 5 mg Oral Daily     Continuous Infusions:    sodium chloride 50 mL/hr Last Rate: 50 mL/hr (18 0518)       Vital signs in last 24 hours:  Temp:  [96.7 °F (35.9 °C)-97 °F (36.1 °C)] 96.9 °F (36.1 °C)  Heart Rate:  [69-79] 70  Resp:  [16-22] 16  BP: (110-124)/(63-74) 124/74    Intake/Output:    Intake/Output Summary (Last 24 hours) at 18 07  Last data filed at 18 0541   Gross per 24 hour   Intake             2530 ml   Output                0 ml   Net             2530 ml       Exam:  /74 (BP Location: Right arm, Patient Position: Lying)   Pulse 70   Temp 96.9 °F (36.1 °C) (Oral)   Resp 16   Ht 175 cm (68.9\")   Wt 49.9 kg (110 lb 0.2 oz)   SpO2 95%   BMI 16.29 kg/m²     General Appearance:    Alert, cooperative, no distress, AAOx3                          Head:    Normocephalic, without obvious abnormality, atraumatic                           Eyes:    PERRL, conjunctiva/corneas clear, EOM's intact, both eyes                         Throat:   Lips, tongue, gums normal; oral mucosa pink and moist                           Neck:   Supple, symmetrical, trachea midline, no JVD                         Lungs:    Clear to auscultation bilaterally, " respirations unlabored                 Chest Wall:    No tenderness or deformity                          Heart:    Regular rate and rhythm, S1 and S2 normal, no murmur                  Abdomen:     Soft, non-tender, bowel sounds active, no masses                 Extremities:   Almost complete resolution of erythema right elbow.  Both flexion extension and supination pronation are much improved compared to yesterday.                        Pulses:   Pulses palpable in all extremities                            Skin:   Skin is warm and dry,  no rashes or palpable lesions                  Data Review:    I reviewed the patient's new clinical results.    Results from last 7 days  Lab Units 07/30/18  0607 07/29/18  2258   WBC 10*3/mm3 7.38 9.03   HEMOGLOBIN g/dL 12.1* 13.3*   PLATELETS 10*3/mm3 226 256       Results from last 7 days  Lab Units 07/30/18  0607 07/29/18  2258   SODIUM mmol/L 137 140   POTASSIUM mmol/L 3.6 4.2   CHLORIDE mmol/L 100 98   CO2 mmol/L 25.5 31.9*   BUN mg/dL 15 21   CREATININE mg/dL 0.67* 0.92   CALCIUM mg/dL 8.2* 9.0   GLUCOSE mg/dL 107* 151*     Microbiology Results (last 10 days)     Procedure Component Value - Date/Time    Body Fluid Culture - Aspirate, Elbow, Right [[id number]]  (Normal) Collected:  07/30/18 1357    Lab Status:  Preliminary result Specimen:  Aspirate from Elbow, Right Updated:  08/01/18 0640     BF Culture No growth at 2 days     Gram Stain Result Few (2+) WBCs seen      No organisms seen    Blood Culture - Blood, [[id number]]  (Normal) Collected:  07/29/18 3063    Lab Status:  Preliminary result Specimen:  Blood from Arm, Left Updated:  08/01/18 0000     Blood Culture No growth at 2 days    Blood Culture - Blood, [[id number]]  (Normal) Collected:  07/29/18 2353    Lab Status:  Preliminary result Specimen:  Blood from Arm, Left Updated:  08/01/18 0000     Blood Culture No growth at 2 days            Assessment:  1- 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   3-other diagnosis  History of seizure disorder   History of intracranial bleed   History of hypothyroidism  History of chronic degenerative disc disease of the spine  History of left shoulder surgery for trauma longtime ago  Hemochromatosis  Recommendations:  doing well and likelihood of infection is low. OK to dc on oral tapering steroid with close follow up with ortho  Discussed with Dr. Rajan.    Sandee Navarro MD  8/1/2018  7:41 AM    Much of this encounter note is an electronic transcription/translation of spoken language to printed text. The electronic translation of spoken language may permit erroneous, or at times, nonsensical words or phrases to be inadvertently transcribed; Although I have reviewed the note for such errors, some may still exist

## 2018-08-01 NOTE — PROGRESS NOTES
Case Management Discharge Note    Final Note: Orders received to dc home. No needs identified. Family to transport per private auto.    Destination     No service has been selected for the patient.      Durable Medical Equipment     No service has been selected for the patient.      Dialysis/Infusion     No service has been selected for the patient.      Home Medical Care     No service has been selected for the patient.      Social Care     No service has been selected for the patient.             Final Discharge Disposition Code: 01 - home or self-care

## 2018-08-01 NOTE — PROGRESS NOTES
Orthopedic Progress Note      Patient: Dmitri Alberto    YOB: 1950    Medical Record Number: [id number]    Attending Physician: Manuel Bernal MD    Date of Admission: 7/29/2018 10:14 PM    Admitting Dx:  Cellulitis of right elbow [L03.113]     Chief complaint elbow feeling better    ROS: No fevers or chills, elbow pain improved    Current Problem List:   Patient Active Problem List   Diagnosis   • Adverse drug effect   • Partial symptomatic epilepsy with complex partial seizures, not intractable, without status epilepticus (CMS/HCC)   • Generalized convulsive seizures (CMS/HCC)   • Alteration in cognition   • Cellulitis of right elbow   • Hypothyroidism   • Hereditary hemochromatosis (CMS/HCC)   • Severe malnutrition (CMS/HCC)   • Pseudogout of elbow, right         Past Medical History:   Diagnosis Date   • Brain bleed (CMS/HCC)     reported by spouse, ocurred in 2011   • Disorder of lumbar spine    • Diverticulitis    • Panic attacks    • Seizures (CMS/HCC)    • Sleep apnea    • Thyroid disorder        SUBJECTIVE: 68 y.o.  male. Awake and alert.  Feeling better today.     OBJECTIVE:   Vitals:    07/31/18 1607 07/31/18 1959 08/01/18 0411 08/01/18 0838   BP: 110/63 119/64 124/74 123/73   BP Location: Right arm Right arm Right arm Right arm   Patient Position: Lying Lying Lying Sitting   Pulse: 78 79 70 80   Resp: 18 18 16 16   Temp: 96.7 °F (35.9 °C) 97 °F (36.1 °C) 96.9 °F (36.1 °C) 96.8 °F (36 °C)   TempSrc: Oral Oral Oral Oral   SpO2: 93% 97% 95% 96%   Weight:       Height:         I/O last 3 completed shifts:  In: 2780 [P.O.:610; I.V.:1920; IV Piggyback:250]  Out: 100 [Urine:100]    Current Medications:  Scheduled Meds:  levothyroxine 50 mcg Oral Q AM   mesalamine 1.2 g Oral Daily With Breakfast   phenytoin 200 mg Oral Q12H   [START ON 8/2/2018] predniSONE 30 mg Oral Daily   Followed by      [START ON 8/4/2018] predniSONE 20 mg Oral Daily   Followed by      [START ON 8/6/2018]  predniSONE 10 mg Oral Daily   Followed by      [START ON 8/8/2018] predniSONE 5 mg Oral Daily     PRN Meds:.•  acetaminophen  •  hydrOXYzine  •  ondansetron **OR** ondansetron ODT **OR** ondansetron  •  oxyCODONE-acetaminophen  •  sodium chloride    Diagnostic Tests:   Lab Results (last 24 hours)     Procedure Component Value Units Date/Time    Body Fluid Culture - Aspirate, Elbow, Right [[id number]]  (Normal) Collected:  07/30/18 1357    Specimen:  Aspirate from Elbow, Right Updated:  08/01/18 0640     BF Culture No growth at 2 days     Gram Stain Result Few (2+) WBCs seen      No organisms seen    Blood Culture - Blood, [[id number]]  (Normal) Collected:  07/29/18 2353    Specimen:  Blood from Arm, Left Updated:  08/01/18 0000     Blood Culture No growth at 2 days    Blood Culture - Blood, [[id number]]  (Normal) Collected:  07/29/18 2353    Specimen:  Blood from Arm, Left Updated:  08/01/18 0000     Blood Culture No growth at 2 days          PHYSICAL EXAM:  Right elbow better today.  Erythema almost completely resolved.  Palpable tenderness much improved.  Has restricted ROM to right due to DJD.  No irritability with range of motion      Cultures Main negative negative.  I think this is pseudogout and will continue steroids and tapering as recommended by Dr. Navarro.        ASSESSMENT & PLAN:  Have instructed patient and wife that he is to avoid pushing, pulling, leaning on  or lifting with that RUE.  Have concerns about whether patient will follow instructions.  Have advised patient to contact the office if he develops a fever, increase pain and swelling or erythema.  Otherwise will follow up as needed.     Poke at length with patient and his wife about this.  She was inquiring about seeing someone for an elbow replacement evaluation.  She knows some of the hand surgeons at [facility] commended that she seek evaluation there for possible elbow replacement if they're interested.    Danilo Rajan,  MD      Date: 8/1/2018    Sridevi Summers RN

## 2018-08-01 NOTE — PLAN OF CARE
Problem: Pain, Acute (Adult)  Goal: Acceptable Pain Control/Comfort Level  Outcome: Ongoing (interventions implemented as appropriate)      Problem: Fall Risk (Adult)  Goal: Absence of Fall  Outcome: Ongoing (interventions implemented as appropriate)      Problem: Patient Care Overview  Goal: Plan of Care Review  Outcome: Ongoing (interventions implemented as appropriate)   07/31/18 0354 07/31/18 1615 07/31/18 2000   Coping/Psychosocial   Plan of Care Reviewed With --  --  patient   OTHER   Outcome Summary --  --  --    Coping/Psychosocial   Patient Agreement with Plan of Care agrees --  --    Plan of Care Review   Progress --  no change --     08/01/18 0219   Coping/Psychosocial   Plan of Care Reviewed With --    OTHER   Outcome Summary Steady gait. Right elbow inflammation decreased. Site tender, Percocet given. I&D of elbow on hold.    Coping/Psychosocial   Patient Agreement with Plan of Care --    Plan of Care Review   Progress --      Goal: Individualization and Mutuality  Outcome: Ongoing (interventions implemented as appropriate)    Goal: Discharge Needs Assessment  Outcome: Ongoing (interventions implemented as appropriate)    Goal: Interprofessional Rounds/Family Conf  Outcome: Ongoing (interventions implemented as appropriate)

## 2018-08-01 NOTE — DISCHARGE SUMMARY
Name: Dmitri Alberto  Age: 68 y.o.  Sex: male  :  1950  MRN: [id number]         Primary Care Physician: Pranay Sherwood      Date of Admission:  2018  Date of Discharge:  2018      CHIEF COMPLAINT     Elbow Pain      DISCHARGE DIAGNOSIS  Active Hospital Problems    Diagnosis Date Noted   • **Pseudogout of elbow, right [M11.221] 2018   • Severe malnutrition (CMS/HCC) [E43] 2018   • Hypothyroidism [E03.9] 2018   • Hereditary hemochromatosis (CMS/HCC) [E83.110] 2018   • Partial symptomatic epilepsy with complex partial seizures, not intractable, without status epilepticus (CMS/HCC) [G40.209] 2017      Resolved Hospital Problems    Diagnosis Date Noted Date Resolved   No resolved problems to display.       SECONDARY DIAGNOSES  Past Medical History:   Diagnosis Date   • Brain bleed (CMS/HCC)     reported by spouse, ocurred in    • Disorder of lumbar spine    • Diverticulitis    • Panic attacks    • Seizures (CMS/HCC)    • Sleep apnea    • Thyroid disorder        CONSULTS   Consulting Physician(s)     Provider Relationship Specialty    Sandee Navarro MD Consulting Physician Infectious Diseases    Danilo Rajan MD Consulting Physician Orthopedic Surgery    Manuel Bernal MD Consulting Physician Pulmonary Disease          PROCEDURES PERFORMED  Elbow aspiration      HOSPITAL COURSE  This is a 68-year-old, with a history of seizures, hemochromatosis was admitted with a history of having pain in the right elbow.  Initially it was thought that the patient may have septic arthritis hence he was started on antibiotics and orthopedic surgery was consulted.  Dr. Rajan aspirated the elbow found that he had crystals.  Suspected that the patient may have pseudogout and was started on prednisone.  The antibiotics have been stopped.  Patient now feels much better and the swelling is reduced.  He'll be discharged home on a prednisone taper along with pain  medications.  While the patient is on the pain medications he is advised not to drive.  In addition is also advised not to pull, push, bleeding on and lift using right upper extremity.      Patient is advised not to drive for about 2 weeks    PHYSICAL EXAM  Temp:  [96.7 °F (35.9 °C)-97.5 °F (36.4 °C)] 97.5 °F (36.4 °C)  Heart Rate:  [70-87] 87  Resp:  [16-18] 16  BP: (110-124)/(63-74) 114/64  Body mass index is 16.29 kg/m².  Physical Exam  HEENT: Unremarkable, pupils are round equal and reacting to light   NECK: No lymphadenopathy, throat is clear,   RESPRATORY SYSTEM: Breath sounds are equal on both sides and are normal, no wheezes or crackles  CARDIOVASULAR SYSTEM: Heart rate is regular without murmur  ABDOMEN: Soft, no ascites, no hepatosplenomegaly.  EXTREMITIES: No cyanosis, clubbing or edema    CONDITION ON DISCHARGE  Stable.      DISCHARGE DISPOSITION   Home      ALLERGIES  Allergies   Allergen Reactions   • No Known Drug Allergy        RECENT LABS    Results from last 7 days  Lab Units 07/30/18  0607 07/29/18  2258   WBC 10*3/mm3 7.38 9.03   HEMOGLOBIN g/dL 12.1* 13.3*   HEMATOCRIT % 38.4* 40.9   PLATELETS 10*3/mm3 226 256       Results from last 7 days  Lab Units 07/30/18  0607 07/29/18  2258   SODIUM mmol/L 137 140   POTASSIUM mmol/L 3.6 4.2   CHLORIDE mmol/L 100 98   CO2 mmol/L 25.5 31.9*   BUN mg/dL 15 21   CREATININE mg/dL 0.67* 0.92   GLUCOSE mg/dL 107* 151*   CALCIUM mg/dL 8.2* 9.0           DIET;  Diet Order   Procedures   • Diet Regular; Lactose Restricted       DISCHARGE MEDICATIONS     Your medication list      START taking these medications      Instructions Last Dose Given Next Dose Due   acetaminophen 325 MG tablet  Commonly known as:  TYLENOL      Take 2 tablets by mouth Every 4 (Four) Hours As Needed for Mild Pain .       oxyCODONE-acetaminophen 5-325 MG per tablet  Commonly known as:  PERCOCET      Take 1 tablet by mouth Every 6 (Six) Hours As Needed for Moderate Pain  for up to 8 days.        predniSONE 10 MG tablet  Commonly known as:  DELTASONE      4 tabs a day for 3 days 3 tabs a day for 3 days 2 tabs a day for 3 days 1 tab a day for 3 days       predniSONE 10 MG tablet  Commonly known as:  DELTASONE      Take 3 tablets by mouth Daily for 2 doses.          CHANGE how you take these medications      Instructions Last Dose Given Next Dose Due   phenytoin 100 MG ER capsule  Commonly known as:  DILANTIN  What changed:  · how much to take  · additional instructions      Take 4 daily          CONTINUE taking these medications      Instructions Last Dose Given Next Dose Due   hydrOXYzine 25 MG tablet  Commonly known as:  ATARAX      Take 25 mg by mouth.       levothyroxine 50 MCG tablet  Commonly known as:  SYNTHROID, LEVOTHROID      Take  by mouth Daily.       LIALDA 1.2 g EC tablet  Generic drug:  mesalamine           vitamin D3 5000 units tablet tablet      Take  by mouth.             Where to Get Your Medications      These medications were sent to [address] [facility] [phone]  - [phone]   [address] [facility] [address]    Phone:  [phone]   · predniSONE 10 MG tablet  · predniSONE 10 MG tablet     You can get these medications from any pharmacy    Bring a paper prescription for each of these medications  · oxyCODONE-acetaminophen 5-325 MG per tablet     Information about where to get these medications is not yet available    Ask your nurse or doctor about these medications  · acetaminophen 325 MG tablet        Future Appointments  Date Time Provider Department Center   2/5/2019 11:30 AM Leno Judd MD [facility] None       TEST  RESULTS PENDING AT DISCHARGE  None   Order Current Status    Blood Culture - Blood, Preliminary result    Blood Culture - Blood, Preliminary result    Body Fluid Culture - Aspirate, Elbow, Right Preliminary result           CODE STATUS  Code Status and Medical Interventions:   Ordered at:  07/30/18 0358     Code Status:    CPR     Medical Interventions (Level of Support Prior to Arrest):    Full           Manuel Bernal MD  [facility]  08/01/18      Time: greater than 35 minutes.

## 2018-08-04 LAB
BACTERIA FLD CULT: NORMAL
BACTERIA SPEC AEROBE CULT: NORMAL
BACTERIA SPEC AEROBE CULT: NORMAL
GRAM STN SPEC: NORMAL
GRAM STN SPEC: NORMAL

## 2018-10-21 ENCOUNTER — HOSPITAL ENCOUNTER (EMERGENCY)
Facility: HOSPITAL | Age: 68
Discharge: HOME | End: 2018-10-22
Attending: EMERGENCY MEDICINE | Admitting: EMERGENCY MEDICINE

## 2018-10-21 DIAGNOSIS — S01.111A LACERATION OF EYEBROW AND FOREHEAD, RIGHT, INITIAL ENCOUNTER: Primary | ICD-10-CM

## 2018-10-21 DIAGNOSIS — S01.81XA LACERATION OF EYEBROW AND FOREHEAD, RIGHT, INITIAL ENCOUNTER: Primary | ICD-10-CM

## 2018-10-21 PROCEDURE — 99283 EMERGENCY DEPT VISIT LOW MDM: CPT

## 2018-10-22 VITALS
OXYGEN SATURATION: 98 % | HEIGHT: 70 IN | DIASTOLIC BLOOD PRESSURE: 101 MMHG | SYSTOLIC BLOOD PRESSURE: 144 MMHG | RESPIRATION RATE: 16 BRPM | TEMPERATURE: 97 F | HEART RATE: 78 BPM

## 2018-10-22 NOTE — ED PROVIDER NOTES
Pt presents to the ED c/o a facial laceration s/t a mechanical fall. He denies LOC, neck pain, or any other injuries.  On exam, Pt is resting comfortably, in no distress, and without focal neurologic deficit. . There is a 2 cm repaired mid forehead lac. No neck tenderness. I agree with the plan for d/c     Attestation:  The JUNIOR and I have discussed this patient's history, physical exam, and treatment plan.  I have reviewed the documentation and personally had a face to face interaction with the patient. I affirm the documentation and agree with the treatment and plan.  The attached note describes my personal findings.      Documentation assistance provided by nery Bai for Dr Allen Information recorded by the scribe was done at my direction and has been verified and validated by me.     Ella Bai  10/22/18 0126       Christopher Allen MD  10/22/18 0233

## 2018-10-22 NOTE — ED NOTES
Pt to ED for CC of fall. Per wife report, pt tripped over a rug and hit his head/face on the . Pt has a facial laceration between his eyebrows and c/o of right shoulder pain, full ROM noted to RUE. Pt denies LOC and is not on blood thinners. Unknown of last tetanus shot.      Allison Lucero, RN  10/21/18 2578

## 2018-10-22 NOTE — ED PROVIDER NOTES
EMERGENCY DEPARTMENT ENCOUNTER    CHIEF COMPLAINT  Chief Complaint: forehead laceration  History given by: Patient  History limited by: none  Room Number: 32/32  PMD: Pranay Sherwood      HPI:  Pt is a 68 y.o. male who presents complaining of forehead laceration after tripping on a rug around 2300. Pt states he hit his head on the . Pt denies neck pain, LOC, HA, abd pain, or extremity pain. Pt states he hit his teeth but denies tooth pain or loose teeth.    Duration:  Since 2300  Onset: sudden  Timing: constant  Location: right forehead  Radiation: none  Quality: laceration  Intensity/Severity: moderate  Progression: unchanged  Associated Symptoms: none  Aggravating Factors: none  Alleviating Factors: none  Previous Episodes: Pt has hx of falls.  Treatment before arrival: none    PAST MEDICAL HISTORY  Active Ambulatory Problems     Diagnosis Date Noted   • Adverse drug effect 02/29/2016   • Partial symptomatic epilepsy with complex partial seizures, not intractable, without status epilepticus (CMS/HCC) 02/06/2017   • Generalized convulsive seizures (CMS/HCC) 02/06/2018   • Alteration in cognition 02/06/2018   • Cellulitis of right elbow 07/30/2018   • Hypothyroidism 07/30/2018   • Hereditary hemochromatosis (CMS/HCC) 07/30/2018   • Severe malnutrition (CMS/HCC) 07/31/2018   • Pseudogout of elbow, right 07/31/2018     Resolved Ambulatory Problems     Diagnosis Date Noted   • No Resolved Ambulatory Problems     Past Medical History:   Diagnosis Date   • Brain bleed (CMS/HCC)    • Disorder of lumbar spine    • Diverticulitis    • Panic attacks    • Seizures (CMS/HCC)    • Sleep apnea    • Thyroid disorder        PAST SURGICAL HISTORY  Past Surgical History:   Procedure Laterality Date   • HAND SURGERY     • HERNIA REPAIR     • SHOULDER SURGERY         FAMILY HISTORY  Family History   Problem Relation Age of Onset   • Stroke Mother    • Seizures Mother    • Hypertension Mother    • Thyroid disease Mother     • Stroke Father    • Bleeding Disorder Father    • No Known Problems Maternal Grandmother    • No Known Problems Maternal Grandfather    • No Known Problems Paternal Grandmother    • No Known Problems Paternal Grandfather        SOCIAL HISTORY  Social History     Social History   • Marital status:      Spouse name: N/A   • Number of children: N/A   • Years of education: N/A     Occupational History   • Not on file.     Social History Main Topics   • Smoking status: Former Smoker   • Smokeless tobacco: Never Used   • Alcohol use No   • Drug use: No   • Sexual activity: Not on file     Other Topics Concern   • Not on file     Social History Narrative   • No narrative on file       ALLERGIES  No known drug allergy    REVIEW OF SYSTEMS  Review of Systems   Constitutional: Negative for activity change, appetite change and fever.   HENT: Negative for congestion and sore throat.    Eyes: Negative.    Respiratory: Negative for cough and shortness of breath.    Cardiovascular: Negative for chest pain and leg swelling.   Gastrointestinal: Negative for abdominal pain, diarrhea and vomiting.   Endocrine: Negative.    Genitourinary: Negative for decreased urine volume and dysuria.   Musculoskeletal: Negative for neck pain.        (-) extremity pain   Skin: Negative for rash.        (+) forehead laceration   Allergic/Immunologic: Negative.    Neurological: Negative for weakness, numbness and headaches.        (-) LOC   Hematological: Negative.    Psychiatric/Behavioral: Negative.    All other systems reviewed and are negative.      PHYSICAL EXAM  ED Triage Vitals   Temp Heart Rate Resp BP SpO2   10/21/18 2312 10/21/18 2312 10/21/18 2312 10/21/18 2340 10/21/18 2312   97 °F (36.1 °C) 89 16 122/79 92 %      Temp src Heart Rate Source Patient Position BP Location FiO2 (%)   10/21/18 2312 10/21/18 2312 10/21/18 2340 10/21/18 2340 --   Oral Monitor Sitting Right arm        Physical Exam   Constitutional: He is oriented to  person, place, and time. No distress.   HENT:   Head: Normocephalic and atraumatic.   No dental injury  Upper lip swollen   Eyes: Pupils are equal, round, and reactive to light. EOM are normal.   Neck: Normal range of motion. Neck supple.   Cardiovascular: Normal rate, regular rhythm and normal heart sounds.    Pulmonary/Chest: Effort normal and breath sounds normal. No respiratory distress.   Abdominal: Soft. There is no tenderness. There is no rebound and no guarding.   Musculoskeletal: Normal range of motion. He exhibits no edema.        Cervical back: He exhibits no tenderness.   Neurological: He is alert and oriented to person, place, and time. He has normal sensation and normal strength.   Skin: Skin is warm and dry. Laceration noted.   2.5 cm laceration to right forehead.   Psychiatric: Mood and affect normal.   Nursing note and vitals reviewed.        PROCEDURES  Laceration Repair  Date/Time: 10/22/2018 12:52 AM  Performed by: ACE LI  Authorized by: BRIAN MATIAS     Consent:     Consent obtained:  Verbal    Consent given by:  Patient    Risks discussed:  Pain  Anesthesia (see MAR for exact dosages):     Anesthesia method:  Local infiltration    Local anesthetic:  Lidocaine 1% WITH epi  Laceration details:     Location:  Face    Face location:  Forehead    Length (cm):  2.5  Repair type:     Repair type:  Simple  Pre-procedure details:     Preparation:  Patient was prepped and draped in usual sterile fashion  Exploration:     Hemostasis achieved with:  Direct pressure  Treatment:     Area cleansed with:  Betadine and saline    Amount of cleaning:  Standard    Irrigation solution:  Sterile saline    Irrigation method:  Pressure wash  Skin repair:     Repair method:  Sutures    Suture size:  6-0    Suture material:  Nylon    Suture technique:  Simple interrupted    Number of sutures:  6  Approximation:     Approximation:  Close  Post-procedure details:     Dressing:  Antibiotic ointment     Patient tolerance of procedure:  Tolerated well, no immediate complications                    PROGRESS AND CONSULTS     1311-Rechecked pt. Pt is resting comfortably. Notified pt of placement of 6 sutures. Discussed the plan to discharge the pt home. I instructed the pt to have sutures removed in about a week and to follow up with PCP. Pt understands and agrees with the plan, all questions answered.    1313-Discussed pt case with  (ED physician) who agrees with the plan of treatment.      MEDICAL DECISION MAKING  Results were reviewed/discussed with the patient and they were also made aware of online access. Pt also made aware that some labs, such as cultures, will not be resulted during ER visit and follow up with PMD is necessary.     MDM  Number of Diagnoses or Management Options     Amount and/or Complexity of Data Reviewed  Decide to obtain previous medical records or to obtain history from someone other than the patient: yes           DIAGNOSIS  Final diagnoses:   Laceration of eyebrow and forehead, right, initial encounter       DISPOSITION  DISCHARGE    Patient discharged in stable condition.    Reviewed implications of results, diagnosis, meds, responsibility to follow up, warning signs and symptoms of possible worsening, potential complications and reasons to return to ER.    Patient/Family voiced understanding of above instructions.    Discussed plan for discharge, as there is no emergent indication for admission. Patient referred to primary care provider for BP management due to today's BP. Pt/family is agreeable and understands need for follow up and repeat testing.  Pt is aware that discharge does not mean that nothing is wrong but it indicates no emergency is present that requires admission and they must continue care with follow-up as given below or physician of their choice.     FOLLOW-UP  Pranay Sherwood  [address]  [address] [place] [id number] [phone]    Schedule an  appointment as soon as possible for a visit in 1 week  For suture removal         Medication List      Changed    phenytoin 100 MG ER capsule  Commonly known as:  DILANTIN  Take 4 daily  What changed:  how much to take  additional instructions        Stop    predniSONE 10 MG tablet  Commonly known as:  DELTASONE              Latest Documented Vital Signs:  As of 2:30 AM  BP- (!) 144/101 HR- 78 Temp- 97 °F (36.1 °C) (Oral) O2 sat- 98%    --  Documentation assistance provided by nery Whitehead for Justin Sinha (PA-C).  Information recorded by the scribe was done at my direction and has been verified and validated by me.     Josie Whitehead  10/22/18 0114       Justin Sinha PA  10/22/18 0233

## 2018-10-22 NOTE — DISCHARGE INSTRUCTIONS
Keep laceration clean and dry, apply antibiotic ointment once or twice daily, watch carefully for signs of infection, sutures need to be removed in 7 days. Return to care if any complications.

## 2019-02-05 ENCOUNTER — OFFICE VISIT (OUTPATIENT)
Dept: NEUROLOGY | Facility: CLINIC | Age: 69
End: 2019-02-05

## 2019-02-05 VITALS
SYSTOLIC BLOOD PRESSURE: 90 MMHG | WEIGHT: 117 LBS | HEART RATE: 80 BPM | DIASTOLIC BLOOD PRESSURE: 64 MMHG | BODY MASS INDEX: 16.75 KG/M2 | OXYGEN SATURATION: 98 % | HEIGHT: 70 IN

## 2019-02-05 DIAGNOSIS — G40.209 PARTIAL SYMPTOMATIC EPILEPSY WITH COMPLEX PARTIAL SEIZURES, NOT INTRACTABLE, WITHOUT STATUS EPILEPTICUS (HCC): Primary | ICD-10-CM

## 2019-02-05 DIAGNOSIS — R41.89 ALTERATION IN COGNITION: ICD-10-CM

## 2019-02-05 DIAGNOSIS — R56.9 GENERALIZED CONVULSIVE SEIZURES (HCC): ICD-10-CM

## 2019-02-05 PROCEDURE — 99214 OFFICE O/P EST MOD 30 MIN: CPT | Performed by: PSYCHIATRY & NEUROLOGY

## 2019-02-05 RX ORDER — AMOXICILLIN 250 MG
CAPSULE ORAL
COMMUNITY
Start: 2016-09-01

## 2019-02-05 RX ORDER — PHENYTOIN SODIUM 100 MG/1
CAPSULE, EXTENDED RELEASE ORAL
Qty: 120 CAPSULE | Refills: 11 | Status: SHIPPED | OUTPATIENT
Start: 2019-02-05 | End: 2020-02-10

## 2019-02-05 RX ORDER — OXYCODONE AND ACETAMINOPHEN 10; 325 MG/1; MG/1
1 TABLET ORAL
COMMUNITY
Start: 2018-11-06

## 2019-02-05 RX ORDER — TOCOPHERSOLAN (VITAMIN E TPGS) 400/15ML
LIQUID (ML) ORAL
COMMUNITY

## 2019-02-05 NOTE — PROGRESS NOTES
Subjective:     Patient ID: Dmitri Alberto is a 68 y.o. male.    History of Present Illness     The following portions of the patient's history were reviewed and updated as appropriate: allergies, current medications, past family history, past medical history, past social history, past surgical history and problem list.  EPILEPSY- problem list.  Epilepsy: In 1989 the patient had a seizure of unknown cause. The MRI brain scan was normal on November 17, 1989. He was started on Dilantin and did well. In retrospect he thought the seizure may have been due to excess use of marijuana and alcohol both of which she has subsequently avoided.      1998: In 1998 a seizure resulted in a shoulder fracture and head contusion with negative CT.      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      His last seizure was in 2012 at which time he had stopped taking Dilantin  Now sz free on phenytoin alternating 300 and 400/  Qod. [When he took 400 daily, his level was toxic.]         [facility] shown the possibility of frontotemporal dementia.  No worsening of the problem according to patient and wife.  Brain MRI was scheduled but not followed through by the patient.  The seizure related brain MRI from 1989 was normal          .  Patient notes no worsening of this problem  Review of Systems   Constitutional: Negative for activity change, appetite change and fatigue.   HENT: Negative for ear pain, facial swelling and trouble swallowing.    Eyes: Negative for photophobia, pain and visual disturbance.   Respiratory: Negative for choking, chest tightness and shortness of breath.    Cardiovascular: Negative for chest pain, palpitations and leg swelling.   Gastrointestinal: Negative for abdominal pain,  constipation and nausea.   Endocrine: Negative for polydipsia, polyphagia and polyuria.   Genitourinary: Negative for difficulty urinating, frequency and urgency.   Musculoskeletal: Positive for neck pain. Negative for back pain and gait problem.   Skin: Negative for color change, rash and wound.   Allergic/Immunologic: Negative for environmental allergies, food allergies and immunocompromised state.   Neurological: Negative for dizziness, tremors, seizures, syncope, facial asymmetry, speech difficulty, weakness, light-headedness, numbness and headaches.   Hematological: Negative for adenopathy. Does not bruise/bleed easily.   Psychiatric/Behavioral: Negative for agitation, behavioral problems, confusion, decreased concentration, dysphoric mood, hallucinations, self-injury, sleep disturbance and suicidal ideas. The patient is not nervous/anxious and is not hyperactive.         Objective:    Neurologic Exam     Mental Status   Oriented to person, place, and time.   Attention: normal. Concentration: normal.   Speech: speech is normal   Level of consciousness: alert  I gave some of the [place] test today.  Clock drawing and placement of the hands is normal.  Cube drawing was normal.  Identification of animals normal.  Serial sevens-got 1 wrong and 3 right.  5 digits forward was correct.  3 digits backward was correct.  Sentence repetition-was correct.  Orientation was normal.  Short-term recall was 2 out of 3 after 3 minutes.     Cranial Nerves     CN II   Visual fields full to confrontation.     CN III, IV, VI   Extraocular motions are normal.   Right pupil: Size: 4 mm.   Left pupil: Size: 4 mm.     CN V   Facial sensation intact.     CN VII   Facial expression full, symmetric.     CN VIII   CN VIII normal.     CN XII   CN XII normal.   No abnormality on fundus exam     Motor Exam   Muscle bulk: normal  Overall muscle tone: normal    Sensory Exam   Light touch normal.   Vibration normal.   Right leg vibration:  normal  Left leg vibration: normal    Gait, Coordination, and Reflexes     Gait  Gait: normal    Coordination   Romberg: negative    Tremor   Resting tremor: absent  Intention tremor: absent  Action tremor: absent    Reflexes   Right brachioradialis: 1+  Left brachioradialis: 1+  Right biceps: 1+  Left biceps: 1+  Right triceps: 1+  Left triceps: 1+  Right patellar: 1+  Left patellar: 1+  Right achilles: 1+  Left achilles: 1+      Physical Exam   Constitutional: He is oriented to person, place, and time. He appears well-developed and well-nourished.   Eyes: EOM are normal.   Neck: Normal range of motion. Neck supple.   Cardiovascular: Normal rate.   Pulmonary/Chest: Effort normal.   Neurological: He is oriented to person, place, and time. He has a normal Romberg Test. Gait normal.   Reflex Scores:       Tricep reflexes are 1+ on the right side and 1+ on the left side.       Bicep reflexes are 1+ on the right side and 1+ on the left side.       Brachioradialis reflexes are 1+ on the right side and 1+ on the left side.       Patellar reflexes are 1+ on the right side and 1+ on the left side.       Achilles reflexes are 1+ on the right side and 1+ on the left side.  Psychiatric: He has a normal mood and affect. His speech is normal and behavior is normal.   Nursing note and vitals reviewed.      Assessment/Plan:       Problems Addressed this Visit        Unprioritized    Partial symptomatic epilepsy with complex partial seizures, not intractable, without status epilepticus (CMS/HCC) - Primary    Relevant Medications    phenytoin (DILANTIN) 100 MG ER capsule    Other Relevant Orders    Phenytoin Level, Total    Phenytoin Level, Free    Phenobarbital Level    Generalized convulsive seizures (CMS/HCC)    Relevant Medications    phenytoin (DILANTIN) 100 MG ER capsule    Alteration in cognition         His memory testing was excellent and I don't think he wants to repeat the brain scan.  His seizure control remains good.   He takes Dilantin on a 3 day schedule like this: Day one is 400, day 2 is 400, day 3 is 300.  Then he repeats a cycle    He agreed to phenytoin and free phenytoin levels today    His cognitive testing was excellent and he does not need repeat neuropsychology testing at this time

## 2019-08-27 VITALS
RESPIRATION RATE: 14 BRPM | HEIGHT: 70 IN | HEART RATE: 83 BPM | WEIGHT: 116 LBS | SYSTOLIC BLOOD PRESSURE: 106 MMHG | DIASTOLIC BLOOD PRESSURE: 72 MMHG

## 2019-08-28 ENCOUNTER — OFFICE (AMBULATORY)
Dept: URBAN - METROPOLITAN AREA CLINIC 75 | Facility: CLINIC | Age: 69
End: 2019-08-28

## 2019-08-28 DIAGNOSIS — K51.90 ULCERATIVE COLITIS, UNSPECIFIED, WITHOUT COMPLICATIONS: ICD-10-CM

## 2019-08-28 DIAGNOSIS — K22.70 BARRETT'S ESOPHAGUS WITHOUT DYSPLASIA: ICD-10-CM

## 2019-08-28 PROCEDURE — 99213 OFFICE O/P EST LOW 20 MIN: CPT | Performed by: INTERNAL MEDICINE

## 2019-08-28 RX ORDER — OMEPRAZOLE 20 MG/1
CAPSULE, DELAYED RELEASE ORAL
Qty: 90 | Refills: 3 | Status: ACTIVE

## 2019-10-16 ENCOUNTER — TELEPHONE (OUTPATIENT)
Dept: NEUROLOGY | Facility: CLINIC | Age: 69
End: 2019-10-16

## 2019-10-16 DIAGNOSIS — T42.0X1A: ICD-10-CM

## 2019-10-16 DIAGNOSIS — G40.209 PARTIAL SYMPTOMATIC EPILEPSY WITH COMPLEX PARTIAL SEIZURES, NOT INTRACTABLE, WITHOUT STATUS EPILEPTICUS (HCC): Primary | ICD-10-CM

## 2019-10-17 NOTE — TELEPHONE ENCOUNTER
Mrs. Alberto called this evening reporting that her  is Dilantin toxic with staggering gait and dizziness for few days.  They went to the PCP.  Dilantin level was checked.  Dilantin level was 44.1.    Reviewed his dose of generic phenytoin.  In February he was taking phenytoin on a 3-day schedule: 400/400/300.  We checked levels.  The Dilantin level was 19 and the free level was 2.2    Therefore his level has slowly risen since February to a toxic level as discussed above.    I recommended that he not take his phenytoin tonight.  Tomorrow which is Thursday he will not take Dilantin in the morning but will take 100 mg at bedtime.  Beginning on Friday and continuing through the weekend and Monday the phenytoin dose will be 100 twice daily.    He will [facility] for a phenytoin level and free level next Monday but continue on 100 twice daily until I call them back with the results.  He is not falling and does not appear to be at danger.    My review dating back the last 4 years reveals the following:  In 2013 he alternated 300/400 every other day with a free level of 0.6.  In 2016 he said he was on 400 daily with a free level of 0.6 the dose was therefore increased to 430.  Later that year the Dilantin was being taken 400 mg 4 days a week and 430 mg on 3 days/week.  He was on warfarin at that time  Warfarin was discontinued in November 2017 and the phenytoin level was 10.5.  On February 18, 2019 the phenytoin was 19.2 and the free level 2.2 and he was taking it on the 400/400/300 schedule as reported above.

## 2019-10-21 ENCOUNTER — LAB (OUTPATIENT)
Dept: LAB | Facility: HOSPITAL | Age: 69
End: 2019-10-21

## 2019-10-21 ENCOUNTER — TELEPHONE (OUTPATIENT)
Dept: NEUROLOGY | Facility: CLINIC | Age: 69
End: 2019-10-21

## 2019-10-21 DIAGNOSIS — T42.0X1A: ICD-10-CM

## 2019-10-21 LAB — PHENYTOIN SERPL-MCNC: 23.5 MCG/ML (ref 10–20)

## 2019-10-21 PROCEDURE — 80186 ASSAY OF PHENYTOIN FREE: CPT | Performed by: PSYCHIATRY & NEUROLOGY

## 2019-10-21 PROCEDURE — 36415 COLL VENOUS BLD VENIPUNCTURE: CPT

## 2019-10-21 PROCEDURE — 80185 ASSAY OF PHENYTOIN TOTAL: CPT | Performed by: PSYCHIATRY & NEUROLOGY

## 2019-10-21 RX ORDER — PHENYTOIN SODIUM 100 MG/1
100 CAPSULE, EXTENDED RELEASE ORAL
COMMUNITY
End: 2020-03-24 | Stop reason: SDUPTHER

## 2019-10-21 RX ORDER — OMEPRAZOLE 20 MG/1
20 CAPSULE, DELAYED RELEASE ORAL EVERY MORNING
Refills: 11 | COMMUNITY
Start: 2019-09-22

## 2019-10-21 RX ORDER — LEVOTHYROXINE SODIUM 0.07 MG/1
75 TABLET ORAL DAILY
Refills: 3 | COMMUNITY
Start: 2019-08-13

## 2019-10-21 NOTE — TELEPHONE ENCOUNTER
[facility] CALLED WITH A CRITICAL LAB RESULT WITH PATIENT'S PHENYTOIN LEVEL, 23.5  RANGE IS 10-20.    Please advise.  Thanks!

## 2019-10-21 NOTE — TELEPHONE ENCOUNTER
----- Message from Magdalena Fontenot sent at 10/21/2019 12:35 PM EDT -----  Contact: [phone]  Patient fell over a barrel and hit his chest. Every since that incident patient has been coughing. Patients wife is wondering if  could order a X-ray of patients chest. Wife would like to know lab results as well.

## 2019-10-22 ENCOUNTER — TELEPHONE (OUTPATIENT)
Dept: NEUROLOGY | Facility: CLINIC | Age: 69
End: 2019-10-22

## 2019-10-22 DIAGNOSIS — T42.0X1A: Primary | ICD-10-CM

## 2019-10-22 NOTE — TELEPHONE ENCOUNTER
Let her know that insurance likely will only cover a chest trauma xray if PCP orders it.      I will call her later about the dilantin results as I am at [facility] today, but please have him change the dilantin dose to 100 mg in am and 200 mg in PM starting today.

## 2019-10-22 NOTE — TELEPHONE ENCOUNTER
I spoke to Mrs. Alberto.  His phenytoin level of 44 and his PCP office has now come down yesterday to a phenytoin level of 23 and he feels better.    I think he was taking phenytoin incorrectly but he also has lost 5 or 7 pounds so may be that had an effect.    I recommended a new dose of for generic phenytoin, 130 mg in the morning and 200 mg at night,    I also recommended that we repeat the free level and the phenytoin level during the last 10 days of November at the [facility] lab.    I will submit that prescription and the new order for the lab

## 2019-10-23 LAB — PHENYTOIN FREE SERPL-MCNC: 1.8 UG/ML (ref 1–2)

## 2019-11-12 ENCOUNTER — TELEPHONE (OUTPATIENT)
Dept: NEUROLOGY | Facility: CLINIC | Age: 69
End: 2019-11-12

## 2019-11-12 NOTE — TELEPHONE ENCOUNTER
----- Message from Abran Cleary sent at 11/12/2019 10:06 AM EST -----  Contact: Dmitri  Please put in order for Dilatin levels to be retested. ASAP. Dr. Judd wanted them tested week of Thanksgiving, because they were critical. Please advise. Thank you. Call patients wife Libertad when order is in.

## 2019-11-12 NOTE — TELEPHONE ENCOUNTER
Called pt spoke to wife and let her know that the lab order was in chart. She stated thanks and understanding.

## 2019-12-31 DIAGNOSIS — T42.0X1A: Primary | ICD-10-CM

## 2020-01-01 ENCOUNTER — OFFICE (AMBULATORY)
Dept: URBAN - METROPOLITAN AREA CLINIC 75 | Facility: CLINIC | Age: 70
End: 2020-01-01

## 2020-01-01 VITALS — OXYGEN SATURATION: 100 % | WEIGHT: 115 LBS | HEART RATE: 87 BPM | TEMPERATURE: 97 F | HEIGHT: 70 IN

## 2020-01-01 DIAGNOSIS — K57.30 DIVERTICULOSIS OF LARGE INTESTINE WITHOUT PERFORATION OR ABS: ICD-10-CM

## 2020-01-01 DIAGNOSIS — M81.0 AGE-RELATED OSTEOPOROSIS WITHOUT CURRENT PATHOLOGICAL FRACTU: ICD-10-CM

## 2020-01-01 DIAGNOSIS — K51.90 ULCERATIVE COLITIS, UNSPECIFIED, WITHOUT COMPLICATIONS: ICD-10-CM

## 2020-01-01 DIAGNOSIS — K44.9 DIAPHRAGMATIC HERNIA WITHOUT OBSTRUCTION OR GANGRENE: ICD-10-CM

## 2020-01-01 DIAGNOSIS — K22.70 BARRETT'S ESOPHAGUS WITHOUT DYSPLASIA: ICD-10-CM

## 2020-01-01 DIAGNOSIS — R63.6 UNDERWEIGHT: ICD-10-CM

## 2020-01-01 PROCEDURE — 99215 OFFICE O/P EST HI 40 MIN: CPT | Performed by: NURSE PRACTITIONER

## 2020-01-01 RX ORDER — PREDNISONE 10 MG/1
TABLET ORAL
Qty: 105 | Refills: 1 | Status: ACTIVE
Start: 2020-01-01

## 2020-01-01 RX ORDER — DIPHENOXYLATE HYDROCHLORIDE AND ATROPINE SULFATE 2.5; .025 MG/1; MG/1
TABLET ORAL
Qty: 60 | Refills: 1 | Status: ACTIVE
Start: 2020-01-01

## 2020-01-01 RX ORDER — OMEPRAZOLE 20 MG/1
CAPSULE, DELAYED RELEASE ORAL
Qty: 90 | Refills: 3 | Status: ACTIVE

## 2020-01-01 RX ORDER — MESALAMINE 1.2 G/1
TABLET, DELAYED RELEASE ORAL
Qty: 360 | Refills: 3 | Status: ACTIVE
Start: 2018-12-20

## 2020-01-08 ENCOUNTER — TELEPHONE (OUTPATIENT)
Dept: NEUROLOGY | Facility: CLINIC | Age: 70
End: 2020-01-08

## 2020-01-08 NOTE — TELEPHONE ENCOUNTER
----- Message from Leno Judd MD sent at 12/31/2019 10:52 AM EST -----  He was supposed to have repeat Dilantin blood tests last month but was a no-show.  Going to put the labs back into the computer if you could remind him to do the test in January.  He can be scheduled for follow-up with either Guero Desouza or Vaishali depending upon which office he wants to go to.

## 2020-01-08 NOTE — TELEPHONE ENCOUNTER
I called and spoke to the patients wife and she verbalized understanding, also spoke to the patient and he will have labs drawn at the hospital before his appt with Regina

## 2020-01-10 ENCOUNTER — LAB (OUTPATIENT)
Dept: LAB | Facility: HOSPITAL | Age: 70
End: 2020-01-10

## 2020-01-10 ENCOUNTER — TELEPHONE (OUTPATIENT)
Dept: NEUROLOGY | Facility: CLINIC | Age: 70
End: 2020-01-10

## 2020-01-10 DIAGNOSIS — T42.0X1A: ICD-10-CM

## 2020-01-10 DIAGNOSIS — R56.9 GENERALIZED CONVULSIVE SEIZURES (HCC): Primary | ICD-10-CM

## 2020-01-10 LAB — PHENYTOIN SERPL-MCNC: 29.4 MCG/ML (ref 10–20)

## 2020-01-10 PROCEDURE — 80185 ASSAY OF PHENYTOIN TOTAL: CPT

## 2020-01-10 PROCEDURE — 36415 COLL VENOUS BLD VENIPUNCTURE: CPT

## 2020-01-10 NOTE — TELEPHONE ENCOUNTER
Hold medication for today; repeat lab in 1 week. Call w/ any neuro changes and or break-through seizures.

## 2020-01-10 NOTE — TELEPHONE ENCOUNTER
Spoke with wife.  States no s/s of a seizure but states that he has been wobbly.    She is concerned about his dilantin level and concerned for toxicity.  I relayed your message but she wants to know if she should hold his medication today.    Thanks.    ----- Message from ORA Ugarte sent at 1/10/2020  1:33 PM EST -----  Will you call patient/wife to see how his behavior is...any s/s of seizure? If he is unchanged I will plan to keep dose as is and repeat again prior to next appointment.      Dilantin Level today 29.4

## 2020-01-10 NOTE — TELEPHONE ENCOUNTER
I spoke to Elayne and she was going to text Regina Patel as she was on Hospital call and not in the office.

## 2020-01-16 ENCOUNTER — LAB (OUTPATIENT)
Dept: LAB | Facility: HOSPITAL | Age: 70
End: 2020-01-16

## 2020-01-16 DIAGNOSIS — Z51.81 ENCOUNTER FOR THERAPEUTIC DRUG LEVEL MONITORING: ICD-10-CM

## 2020-01-16 DIAGNOSIS — R56.9 GENERALIZED CONVULSIVE SEIZURES (HCC): ICD-10-CM

## 2020-01-16 DIAGNOSIS — Z51.81 ENCOUNTER FOR THERAPEUTIC DRUG LEVEL MONITORING: Primary | ICD-10-CM

## 2020-01-16 DIAGNOSIS — E43 SEVERE MALNUTRITION (HCC): ICD-10-CM

## 2020-01-16 LAB — PHENYTOIN SERPL-MCNC: 26.9 MCG/ML (ref 10–20)

## 2020-01-16 PROCEDURE — 36415 COLL VENOUS BLD VENIPUNCTURE: CPT

## 2020-01-16 PROCEDURE — 80185 ASSAY OF PHENYTOIN TOTAL: CPT

## 2020-01-21 DIAGNOSIS — G40.209 PARTIAL SYMPTOMATIC EPILEPSY WITH COMPLEX PARTIAL SEIZURES, NOT INTRACTABLE, WITHOUT STATUS EPILEPTICUS (HCC): ICD-10-CM

## 2020-01-23 ENCOUNTER — LAB (OUTPATIENT)
Dept: LAB | Facility: HOSPITAL | Age: 70
End: 2020-01-23

## 2020-01-23 DIAGNOSIS — R56.9 GENERALIZED CONVULSIVE SEIZURES (HCC): ICD-10-CM

## 2020-01-23 LAB — PHENOBARB SERPL-MCNC: <2.4 MCG/ML (ref 10–30)

## 2020-01-23 PROCEDURE — 80185 ASSAY OF PHENYTOIN TOTAL: CPT

## 2020-01-23 PROCEDURE — 36415 COLL VENOUS BLD VENIPUNCTURE: CPT

## 2020-01-23 PROCEDURE — 80185 ASSAY OF PHENYTOIN TOTAL: CPT | Performed by: PSYCHIATRY & NEUROLOGY

## 2020-01-23 PROCEDURE — 80186 ASSAY OF PHENYTOIN FREE: CPT

## 2020-01-23 PROCEDURE — 80184 ASSAY OF PHENOBARBITAL: CPT | Performed by: PSYCHIATRY & NEUROLOGY

## 2020-01-24 ENCOUNTER — TELEPHONE (OUTPATIENT)
Dept: NEUROLOGY | Facility: CLINIC | Age: 70
End: 2020-01-24

## 2020-01-24 LAB — PHENYTOIN SERPL-MCNC: 18.5 MCG/ML (ref 10–20)

## 2020-01-24 NOTE — TELEPHONE ENCOUNTER
Called pt and told him level was good at 18.5, continue at current dose.  Pt verbalized understanding.

## 2020-01-27 LAB
PHENYTOIN FREE SERPL-MCNC: 1.7 UG/ML (ref 1–2)
PHENYTOIN SERPL-MCNC: 24.7 UG/ML (ref 10–20)

## 2020-01-28 ENCOUNTER — TELEPHONE (OUTPATIENT)
Dept: NEUROLOGY | Facility: CLINIC | Age: 70
End: 2020-01-28

## 2020-01-28 NOTE — TELEPHONE ENCOUNTER
Informed pt and wife levels therapeutic, not changes in current medication.  Verified with pt no issues.  Pt verbalized understanding.

## 2020-01-28 NOTE — TELEPHONE ENCOUNTER
----- Message from ORA Ugarte sent at 1/28/2020 12:30 PM EST -----  Level therapeutic; no changes in current medications.

## 2020-02-10 ENCOUNTER — OFFICE VISIT (OUTPATIENT)
Dept: NEUROLOGY | Facility: CLINIC | Age: 70
End: 2020-02-10

## 2020-02-10 VITALS
BODY MASS INDEX: 17.53 KG/M2 | WEIGHT: 122.47 LBS | SYSTOLIC BLOOD PRESSURE: 122 MMHG | DIASTOLIC BLOOD PRESSURE: 76 MMHG | HEART RATE: 89 BPM | HEIGHT: 70 IN | OXYGEN SATURATION: 94 %

## 2020-02-10 DIAGNOSIS — G40.209 PARTIAL SYMPTOMATIC EPILEPSY WITH COMPLEX PARTIAL SEIZURES, NOT INTRACTABLE, WITHOUT STATUS EPILEPTICUS (HCC): Primary | ICD-10-CM

## 2020-02-10 DIAGNOSIS — R56.9 GENERALIZED CONVULSIVE SEIZURES (HCC): ICD-10-CM

## 2020-02-10 DIAGNOSIS — R41.89 ALTERATION IN COGNITION: ICD-10-CM

## 2020-02-10 PROCEDURE — 99213 OFFICE O/P EST LOW 20 MIN: CPT | Performed by: NURSE PRACTITIONER

## 2020-02-10 NOTE — PROGRESS NOTES
CC: Seizure and cognitive impairment  He is unaccompanied to today's visit.    HPI:  Dmitri Alberto is a  69 y.o.  right-handed male who I am seeing for the first time today in follow-up for seizure and cognitive impairment.    Mr. Alberto was previously seen by Dr. Bora Judd on February 5, 2019 for the same.  According to the chart patient has diagnosis of epilepsy since 1989; etiology of seizures unknown.  Of the brain in 1989 was normal.  He was subsequently started on Dilantin and did well.  Retrospective discussion revealed possible etiology of seizure secondary to use of marijuana and EtOH both of which he has since avoided.  He had a seizure in 1998 which resulted in shoulder fracture and head contusion; head CT at that time was negative.  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  According to Dr. Judd's note the source of seizures thought to be left temporal based upon review of multiple EEGs.  Chart reflects that his last seizure was in 2012 which he had stopped taking his Dilantin.  He has since been seizure-free.  He has had some episodes of Dilantin toxicity therefore tight control has been necessary.  Most recent lab and level 1/23 therapeutic at 18.5.  He was on 100 in a.m. and 200 nightly when this level was received.  Some minor dose adjustments were made prior to getting this therapeutic dose range.  We will plan to repeat his Dilantin level in 3 months given the patient is new to me and see him back in 6 months to further evaluate for presence and/or absence of seizure like activity.    Regarding memory patient reports that he feels as if his memory issues are worsening.  As stated above patient is unaccompanied to today's visit for there is no one to validate his concern.  MMSE completed which revealed 29/30.  Clock draw 4/4.  And animal naming 11 animals in 1 minute.  Reflects that he had neuropsych testing 2/15/2018 which  showed possibility of frontotemporal dementia; no memory altering medications have been initiated and according to today's findings there is no indication to initiate anything at this time.  However, I do have some concerns about patient driving as he is unable to verbalize medication list and other pertinent information during my exam.  He denies any other complaints and/or concerns.  He agrees to follow-up with Dilantin level in 3 months and see me in the office in 6 months.  I have instructed him to have his wife call me should she have any questions and/or concerns.  I will also have staff reach out to her to let her know about today's visit per patient's permission.            Past Medical History:   Diagnosis Date   • Brain bleed (CMS/HCC)     reported by spouse, ocurred in 2011   • Disorder of lumbar spine    • Diverticulitis    • Generalized convulsive seizures (CMS/HCC) 2/6/2018   • Panic attacks    • Partial symptomatic epilepsy with complex partial seizures, not intractable, without status epilepticus (CMS/HCC) 2/6/2017   • Seizures (CMS/HCC)    • Sleep apnea    • Thyroid disorder          Past Surgical History:   Procedure Laterality Date   • HAND SURGERY     • HERNIA REPAIR     • SHOULDER SURGERY             Current Outpatient Medications:   •  Calcium-Magnesium-Vitamin D 500-250-200 MG-MG-UNIT tablet, Take  by mouth., Disp: , Rfl:   •  Cholecalciferol (VITAMIN D3) 5000 units tablet tablet, Take  by mouth., Disp: , Rfl:   •  Cobalamine Combinations (B-12) 100-5000 MCG sublingual tablet, , Disp: , Rfl:   •  levothyroxine (SYNTHROID, LEVOTHROID) 50 MCG tablet, Take  by mouth Daily., Disp: , Rfl:   •  levothyroxine (SYNTHROID, LEVOTHROID) 75 MCG tablet, Take 75 mcg by mouth Daily., Disp: , Rfl: 3  •  LIALDA 1.2 G EC tablet, , Disp: , Rfl:   •  MAGNESIUM PO, Take  by mouth., Disp: , Rfl:   •  omeprazole (priLOSEC) 20 MG capsule, Take 20 mg by mouth Every Morning., Disp: , Rfl: 11  •  phenytoin (DILANTIN)  100 MG ER capsule, Take 100 mg by mouth., Disp: , Rfl:   •  phenytoin (DILANTIN) 30 MG ER capsule, Take 1 capsule by mouth Daily., Disp: 90 capsule, Rfl: 4  •  denosumab (XGEVA) 120 MG/1.7ML solution injection, Inject 60 mg under the skin into the appropriate area as directed., Disp: , Rfl:   •  hydrOXYzine (ATARAX) 25 MG tablet, Take 25 mg by mouth., Disp: , Rfl:   •  oxyCODONE-acetaminophen (PERCOCET)  MG per tablet, Take 1 tablet by mouth., Disp: , Rfl:       Family History   Problem Relation Age of Onset   • Stroke Mother    • Seizures Mother    • Hypertension Mother    • Thyroid disease Mother    • Stroke Father    • Bleeding Disorder Father    • No Known Problems Maternal Grandmother    • No Known Problems Maternal Grandfather    • No Known Problems Paternal Grandmother    • No Known Problems Paternal Grandfather          Social History     Socioeconomic History   • Marital status:      Spouse name: Not on file   • Number of children: Not on file   • Years of education: Not on file   • Highest education level: Not on file   Tobacco Use   • Smoking status: Former Smoker   • Smokeless tobacco: Never Used   Substance and Sexual Activity   • Alcohol use: No   • Drug use: No   • Sexual activity: Defer         Allergies   Allergen Reactions   • No Known Drug Allergy          Pain Scale:0/10        ROS:  Review of Systems   Constitutional: Negative for activity change, appetite change and unexpected weight change.   HENT: Negative for facial swelling, trouble swallowing and voice change.    Eyes: Negative for photophobia, pain and visual disturbance.   Respiratory: Negative for chest tightness, shortness of breath and wheezing.    Cardiovascular: Negative for chest pain, palpitations and leg swelling.   Gastrointestinal: Negative for abdominal pain, nausea and vomiting.   Endocrine: Negative for cold intolerance and heat intolerance.   Musculoskeletal: Positive for gait problem. Negative for arthralgias,  "back pain, joint swelling, myalgias, neck pain and neck stiffness.   Neurological: Positive for dizziness. Negative for tremors, seizures, syncope, facial asymmetry, speech difficulty, weakness, light-headedness, numbness and headaches.   Hematological: Does not bruise/bleed easily.   Psychiatric/Behavioral: Positive for decreased concentration. Negative for agitation, behavioral problems, confusion, dysphoric mood, hallucinations, self-injury, sleep disturbance and suicidal ideas. The patient is not nervous/anxious and is not hyperactive.            Physical Exam:  Vitals:    02/10/20 1301   BP: 122/76   BP Location: Right arm   Patient Position: Sitting   Cuff Size: Adult   Pulse: 89   SpO2: 94%   Weight: 55.6 kg (122 lb 7.5 oz)   Height: 177.8 cm (70\")     Body mass index is 17.57 kg/m².    Physical Exam    Head: Head is erect and midline: skull is normocephalic, symmetrical and smooth without deformity. Facial features are symmetrical;   Neck:  Trachea is midline; no JVD.   Skin:  Warm and dry. No edema, erythema, rashes, eczema, nodules, masses, or signs of infection noted. No scars present.   Hair:  Hair clean and scalp without lesions, no exudates or sores noted. Hair long; has normal distribution.  Nails: well groomed; short nails. Nail beds painted; no redness, exudates, or swelling seen in surrounding folds, and no tenderness to palpation.   Eyes: conjunctiva pink; sclera white; PERRL. No tearing noted. Pupils constricted 4mm to 2mm   Ears:  Normal position.  Chest:  The trachea is midline. The chest is normal in appearance. The chest is clear to percussion and auscultation.  Heart:  HR89 beats per minute, S1 and S2 noted with regular rhythm. /76. No abnormal jugular venous distention. No rubs, murmurs, or gallops noted upon auscultation.   Musculoskeletal:  The extremities are normal in color, size, and temperature. All pulses in the upper and lower extremities are grade II and equal. No clubbing, " cyanosis, or edema is present.   Neurological:  Alert, relaxed, cooperative. Thought process coherent. Oriented to person, place and time. CN II- XII intact. Good muscle bulk and tone. Strength 5/5 throughout. Cerebellar: Rapid alternating movements, finger-to-nose, heel-to-shin intact. Gait with normal base. Sensory:  light touch  intact. Reflexes:  plantar reflexes downgoing.   MMSE 29/30  Clock draw 4/4  Animal naming 11 animals in 1 minute          Results:      Lab Results   Component Value Date    GLUCOSE 107 (H) 07/30/2018    BUN 17 11/05/2019    CREATININE 0.7 11/05/2019    EGFRIFNONA 118 07/30/2018    BCR 25.0 (H) 11/05/2019    CO2 28 11/05/2019    CALCIUM 9.3 11/05/2019    ALBUMIN 4.1 11/05/2019    LABIL2 1.1 11/05/2019    AST 30 11/05/2019    ALT 18 11/05/2019       Lab Results   Component Value Date    WBC 5.94 12/04/2019    HGB 15.1 12/04/2019    HCT 46.9 12/04/2019    .0 12/04/2019     12/04/2019         .No results found for: RPR      Lab Results   Component Value Date    TSH 2.630 11/05/2019         Lab Results   Component Value Date    [id number] >2,000 (H) 05/09/2018         No results found for: FOLATE      Lab Results   Component Value Date    HGBA1C 5.1 10/15/2019               Assessment:   1.  Partial symptomatic epilepsy with complex partial seizures not intractable without status epilepticus; on Dilantin 300 mg/day; seizure-free since 2012  2.  Generalized convulsion seizure  3.  Alteration in cognition; MMSE 29/30          Plan:  Continue Dilantin 100 in a.m. 200 and p.m.  Repeat Dilantin level in 3 months  Follow-up with me in 6 months; sooner if indicated  As patient to call with any breakthrough seizures.      Diagnoses and all orders for this visit:    Partial symptomatic epilepsy with complex partial seizures, not intractable, without status epilepticus (CMS/HCC)  -     Phenytoin Level, Total; Future  -     Phenytoin Level, Free; Future    Generalized convulsive seizures  (CMS/[facility])  -     Phenytoin Level, Total; Future  -     Phenytoin Level, Free; Future    Alteration in cognition        MDM  Reviewed: previous chart and vitals  Reviewed previous: labs  Interpretation: labs and MRI                                Dictated utilizing Dragon dictation.

## 2020-03-25 RX ORDER — PHENYTOIN SODIUM 100 MG/1
CAPSULE, EXTENDED RELEASE ORAL
Qty: 90 CAPSULE | Refills: 2 | OUTPATIENT
Start: 2020-03-25 | End: 2020-06-08

## 2020-06-08 RX ORDER — PHENYTOIN SODIUM 100 MG/1
CAPSULE, EXTENDED RELEASE ORAL
Qty: 90 CAPSULE | Refills: 0 | Status: SHIPPED | OUTPATIENT
Start: 2020-06-08 | End: 2020-07-10

## 2020-06-08 NOTE — TELEPHONE ENCOUNTER
Pt has an order for phenytoin level that was supposed to have been done in May, (suspect COVID-19 delayed). You want this done now? I called and left a message to call back to check for recent seizures.    Thank you

## 2020-06-09 ENCOUNTER — TELEPHONE (OUTPATIENT)
Dept: NEUROLOGY | Facility: CLINIC | Age: 70
End: 2020-06-09

## 2020-07-10 RX ORDER — PHENYTOIN SODIUM 100 MG/1
CAPSULE, EXTENDED RELEASE ORAL
Qty: 90 CAPSULE | Refills: 0 | Status: SHIPPED | OUTPATIENT
Start: 2020-07-10 | End: 2020-08-04

## 2020-08-04 RX ORDER — PHENYTOIN SODIUM 100 MG/1
CAPSULE, EXTENDED RELEASE ORAL
Qty: 90 CAPSULE | Refills: 0 | Status: SHIPPED | OUTPATIENT
Start: 2020-08-04 | End: 2020-09-18

## 2020-08-18 ENCOUNTER — OFFICE VISIT (OUTPATIENT)
Dept: NEUROLOGY | Facility: CLINIC | Age: 70
End: 2020-08-18

## 2020-08-18 VITALS
HEIGHT: 70 IN | DIASTOLIC BLOOD PRESSURE: 70 MMHG | BODY MASS INDEX: 17.18 KG/M2 | HEART RATE: 73 BPM | SYSTOLIC BLOOD PRESSURE: 112 MMHG | OXYGEN SATURATION: 99 % | WEIGHT: 120 LBS

## 2020-08-18 DIAGNOSIS — Z51.81 THERAPEUTIC DRUG MONITORING: ICD-10-CM

## 2020-08-18 DIAGNOSIS — E43 SEVERE MALNUTRITION (HCC): ICD-10-CM

## 2020-08-18 DIAGNOSIS — G40.209 PARTIAL SYMPTOMATIC EPILEPSY WITH COMPLEX PARTIAL SEIZURES, NOT INTRACTABLE, WITHOUT STATUS EPILEPTICUS (HCC): ICD-10-CM

## 2020-08-18 DIAGNOSIS — R56.9 GENERALIZED CONVULSIVE SEIZURES (HCC): Primary | ICD-10-CM

## 2020-08-18 PROCEDURE — 99213 OFFICE O/P EST LOW 20 MIN: CPT | Performed by: NURSE PRACTITIONER

## 2020-08-18 NOTE — PROGRESS NOTES
CC: seizure and prior concern for cognitive impairment      HPI:  Dmitri Alberto is a  70 y.o.  right-handed male who I am seeing today in follow-up for above chief complaints.     Patient was last seen by me 2020 for the same. Since that time he remains seizure-free; no seizures reported since  (per chart review). Patient thinks its been longer since he had a seizure. Unfortunately, patient is unaccompanied to today's visit d/t COVID restrictions. He continues to take Dilantin 100mgXR  in am and 200mg XR QHS. Last total dilantin level 24.7 Free 1.7. Dose held for 1 day and repeat labs recommended which have not been completed.  Patient agrees to complete labs in the next 1 to 2 weeks.    Regarding memory, patient states I do not think that problem gets my wife who thinks I have issues.  He declined proceeding with MMSE/clock draw/animal naming on this exam since he will do the next time when his wife is with him.  Last MMSE 29/30.  Clock draw 4/4 and animal namin animals in 1 minute.  Prior neuropsych testing from 2018 showed possibility of frontotemporal dementia; no memory altering medications were initiated at that time or since.  He denies any falls and/or trauma or recent illnesses.  He denies any other complaints and/or concerns on my exam.  I will plan to see the patient back in 3 months time.         Past Medical History:   Diagnosis Date   • Brain bleed (CMS/HCC)     reported by spouse, ocurred in    • Disorder of lumbar spine    • Diverticulitis    • Generalized convulsive seizures (CMS/HCC) 2018   • Panic attacks    • Partial symptomatic epilepsy with complex partial seizures, not intractable, without status epilepticus (CMS/HCC) 2017   • Seizures (CMS/HCC)    • Sleep apnea    • Thyroid disorder          Past Surgical History:   Procedure Laterality Date   • HAND SURGERY     • HERNIA REPAIR     • SHOULDER SURGERY             Current Outpatient Medications:   •   hydrOXYzine (ATARAX) 25 MG tablet, Take 25 mg by mouth., Disp: , Rfl:   •  levothyroxine (SYNTHROID, LEVOTHROID) 50 MCG tablet, Take  by mouth Daily., Disp: , Rfl:   •  levothyroxine (SYNTHROID, LEVOTHROID) 75 MCG tablet, Take 75 mcg by mouth Daily., Disp: , Rfl: 3  •  oxyCODONE-acetaminophen (PERCOCET)  MG per tablet, Take 1 tablet by mouth., Disp: , Rfl:   •  phenytoin (DILANTIN) 100 MG ER capsule, TAKE 1 CAPSULE BY MOUTH IN THE MORNING AND 2 IN THE EVENING, Disp: 90 capsule, Rfl: 0  •  phenytoin (DILANTIN) 30 MG ER capsule, Take 1 capsule by mouth Daily., Disp: 90 capsule, Rfl: 4  •  Calcium-Magnesium-Vitamin D 500-250-200 MG-MG-UNIT tablet, Take  by mouth., Disp: , Rfl:   •  Cholecalciferol (VITAMIN D3) 5000 units tablet tablet, Take  by mouth., Disp: , Rfl:   •  Cobalamine Combinations (B-12) 100-5000 MCG sublingual tablet, , Disp: , Rfl:   •  LIALDA 1.2 G EC tablet, , Disp: , Rfl:   •  MAGNESIUM PO, Take  by mouth., Disp: , Rfl:   •  omeprazole (priLOSEC) 20 MG capsule, Take 20 mg by mouth Every Morning., Disp: , Rfl: 11      Family History   Problem Relation Age of Onset   • Stroke Mother    • Seizures Mother    • Hypertension Mother    • Thyroid disease Mother    • Stroke Father    • Bleeding Disorder Father    • No Known Problems Maternal Grandmother    • No Known Problems Maternal Grandfather    • No Known Problems Paternal Grandmother    • No Known Problems Paternal Grandfather          Social History     Socioeconomic History   • Marital status:      Spouse name: Not on file   • Number of children: Not on file   • Years of education: Not on file   • Highest education level: Not on file   Tobacco Use   • Smoking status: Former Smoker   • Smokeless tobacco: Never Used   Substance and Sexual Activity   • Alcohol use: No   • Drug use: No   • Sexual activity: Defer         Allergies   Allergen Reactions   • No Known Drug Allergy          Pain Scale:0/10        ROS:  Review of Systems  "  Constitutional: Negative for activity change, appetite change and unexpected weight change.   HENT: Negative for facial swelling, trouble swallowing and voice change.    Eyes: Negative for photophobia, pain and visual disturbance.   Respiratory: Negative for chest tightness, shortness of breath and wheezing.    Cardiovascular: Negative for chest pain, palpitations and leg swelling.   Gastrointestinal: Negative for abdominal pain, nausea and vomiting.   Endocrine: Negative for cold intolerance and heat intolerance.   Musculoskeletal: Positive for gait problem. Negative for arthralgias, back pain, joint swelling, myalgias, neck pain and neck stiffness.   Neurological: Negative for dizziness, tremors, seizures, syncope, facial asymmetry, speech difficulty, weakness, light-headedness, numbness and headaches.   Hematological: Does not bruise/bleed easily.   Psychiatric/Behavioral: Negative for agitation, behavioral problems, confusion, decreased concentration, dysphoric mood, hallucinations, self-injury, sleep disturbance and suicidal ideas. The patient is not nervous/anxious and is not hyperactive.            Physical Exam:  Vitals:    08/18/20 0955   BP: 112/70   BP Location: Left arm   Patient Position: Sitting   Cuff Size: Adult   Pulse: 73   SpO2: 99%   Weight: 54.4 kg (120 lb)   Height: 177.8 cm (70\")     Body mass index is 17.22 kg/m².    Physical Exam  General: Thin; underweight male (BMI 17.2)   Head: Head is erect and midline: skull is normocephalic, symmetrical and smooth without deformity. Facial features are symmetrical;   Neck:  Trachea is midline; no JVD.   Eyes: conjunctiva pink; sclera white; PERRL. No tearing noted. Pupils constricted 4mm to 2mm   Ears:  Normal position.  Chest:  The trachea is midline. The chest is normal in appearance. The chest is clear to percussion and auscultation.  Heart:  HR 73 beats per minute, S1 and S2 noted with regular rhythm. /70 No abnormal jugular venous " distention. No rubs, murmurs, or gallops noted upon auscultation.   Musculoskeletal:  The extremities are normal in color, size, and temperature. All pulses in the upper and lower extremities are grade II and equal. No clubbing, cyanosis, or edema is present.   Neurological:  Alert, relaxed, cooperative. Thought process coherent. Oriented to person, place and time. CN II- XII intact. Good muscle bulk and tone. Strength 5/5 throughout. Cerebellar: Rapid alternating movements, finger-to-nose, heel-to-shin intact. Gait with normal base. Sensory:  light touch  intact. Reflexes:  plantar reflexes downgoing.       Results:      Lab Results   Component Value Date    GLUCOSE 107 (H) 07/30/2018    BUN 15 08/11/2020    CREATININE 0.9 08/11/2020    EGFRIFNONA 118 07/30/2018    BCR 17.0 08/11/2020    CO2 30 08/11/2020    CALCIUM 9.6 08/11/2020    ALBUMIN 4.3 08/11/2020    LABIL2 1.0 (L) 08/11/2020    AST 18 08/11/2020    ALT 10 (L) 08/11/2020       Lab Results   Component Value Date    WBC 9.09 08/11/2020    HGB 15.2 08/11/2020    HCT 48.1 08/11/2020    .6 (H) 08/11/2020     08/11/2020         .No results found for: RPR      Lab Results   Component Value Date    TSH 4.120 08/11/2020         Lab Results   Component Value Date    [id number] >2,000 (H) 05/09/2018         No results found for: FOLATE      Lab Results   Component Value Date    HGBA1C 5.1 10/15/2019         Assessment:   1.  Partial symptomatic epilepsy with complex partial seizures not intractable without status epilepticus; on Dilantin 320mg per day currently; seizure-free since 2012  2.  Generalized convulsion seizure  3.  Alteration in cognition; MMSE 29/30             Plan:  · Continue Dilantin 100mg in am and 200mg qhs   · Dilantin level in next 1-2 weeks; prior toxicity   · Will complete MMSE at next visit  · Follow-up w/ me in 6 months         Dmitri was seen today for seizures.    Diagnoses and all orders for this visit:    Generalized  convulsive seizures (CMS/HCC)  -     Phenytoin Level, Total; Future     Addenda:   08/25/20  Dilantin level critical 25.8- hold evening dose tonight, am and evening dose tomorrow (x 3 doses) then resume 100mg  BID x 7 days then get labs redrawn. Will call with instructions/dosing there after.     ORA Ugarte                                  Dictated utilizing Dragon dictation.

## 2020-08-19 ENCOUNTER — TELEPHONE (OUTPATIENT)
Dept: NEUROLOGY | Facility: CLINIC | Age: 70
End: 2020-08-19

## 2020-08-19 NOTE — TELEPHONE ENCOUNTER
----- Message from ORA Ugarte sent at 8/18/2020 12:44 PM EDT -----  Will you let patient and wife know that I would like lab draw in next 1-2 weeks

## 2020-08-19 NOTE — TELEPHONE ENCOUNTER
Informed wife Regina would like pt to have his phenytoin level drawn in the next 1-2 weeks. Wife is agreeable.

## 2020-08-25 ENCOUNTER — TELEPHONE (OUTPATIENT)
Dept: NEUROLOGY | Facility: CLINIC | Age: 70
End: 2020-08-25

## 2020-08-25 ENCOUNTER — LAB (OUTPATIENT)
Dept: LAB | Facility: HOSPITAL | Age: 70
End: 2020-08-25

## 2020-08-25 DIAGNOSIS — G40.209 PARTIAL SYMPTOMATIC EPILEPSY WITH COMPLEX PARTIAL SEIZURES, NOT INTRACTABLE, WITHOUT STATUS EPILEPTICUS (HCC): ICD-10-CM

## 2020-08-25 DIAGNOSIS — R56.9 GENERALIZED CONVULSIVE SEIZURES (HCC): ICD-10-CM

## 2020-08-25 LAB — PHENYTOIN SERPL-MCNC: 25.8 MCG/ML (ref 10–20)

## 2020-08-25 PROCEDURE — 36415 COLL VENOUS BLD VENIPUNCTURE: CPT

## 2020-08-25 PROCEDURE — 80185 ASSAY OF PHENYTOIN TOTAL: CPT

## 2020-08-25 PROCEDURE — 80186 ASSAY OF PHENYTOIN FREE: CPT

## 2020-08-25 NOTE — TELEPHONE ENCOUNTER
Called and confirmed with wife pt is taking dilantin 100 am and 200 pm. Pt had dilantin levels drawn and critical at 25.8. Instructed pt, per Regina, hold next three doses. Resume Thursday taking 200 mg daily x 7 days and then redraw labs. Orders were placed by Regina. Wife verbalized understanding.

## 2020-08-27 LAB — PHENYTOIN FREE SERPL-MCNC: 2.3 UG/ML (ref 1–2)

## 2020-09-04 ENCOUNTER — LAB (OUTPATIENT)
Dept: LAB | Facility: HOSPITAL | Age: 70
End: 2020-09-04

## 2020-09-04 DIAGNOSIS — T42.0X1A: ICD-10-CM

## 2020-09-04 DIAGNOSIS — R56.9 GENERALIZED CONVULSIVE SEIZURES (HCC): ICD-10-CM

## 2020-09-04 DIAGNOSIS — R56.9 GENERALIZED CONVULSIVE SEIZURES (HCC): Primary | ICD-10-CM

## 2020-09-04 LAB — PHENYTOIN SERPL-MCNC: 10.6 MCG/ML (ref 10–20)

## 2020-09-04 PROCEDURE — 80186 ASSAY OF PHENYTOIN FREE: CPT

## 2020-09-04 PROCEDURE — 36415 COLL VENOUS BLD VENIPUNCTURE: CPT

## 2020-09-04 PROCEDURE — 80185 ASSAY OF PHENYTOIN TOTAL: CPT

## 2020-09-08 ENCOUNTER — TELEPHONE (OUTPATIENT)
Dept: NEUROLOGY | Facility: CLINIC | Age: 70
End: 2020-09-08

## 2020-09-08 LAB — PHENYTOIN FREE SERPL-MCNC: 0.6 UG/ML (ref 1–2)

## 2020-09-08 NOTE — TELEPHONE ENCOUNTER
----- Message from ORA Ugarte sent at 9/8/2020 12:49 PM EDT -----  Within normal limits; on the low end of normal.  No change in current dosing regimen recommended at this time.  Please confirm that patient remains seizure-free.

## 2020-09-18 RX ORDER — PHENYTOIN SODIUM 100 MG/1
CAPSULE, EXTENDED RELEASE ORAL
Qty: 90 CAPSULE | Refills: 0 | Status: SHIPPED | OUTPATIENT
Start: 2020-09-18 | End: 2020-10-15 | Stop reason: SDUPTHER

## 2020-10-16 RX ORDER — PHENYTOIN SODIUM 100 MG/1
CAPSULE, EXTENDED RELEASE ORAL
Qty: 90 CAPSULE | Refills: 3 | Status: SHIPPED | OUTPATIENT
Start: 2020-10-16 | End: 2021-02-26 | Stop reason: SDUPTHER

## 2020-12-30 PROBLEM — K57.30 DVRTCLOS OF LG INT W/O PERFORATION OR ABSCESS W/O BLEEDING: Status: ACTIVE | Noted: 2017-09-05

## 2020-12-30 PROBLEM — K44.9 DIAPHRAGMATIC HERNIA WITHOUT OBSTRUCTION OR GANGRENE: Status: ACTIVE | Noted: 2017-09-05

## 2021-01-12 ENCOUNTER — TELEPHONE (OUTPATIENT)
Dept: NEUROLOGY | Facility: CLINIC | Age: 71
End: 2021-01-12

## 2021-01-12 ENCOUNTER — LAB (OUTPATIENT)
Dept: LAB | Facility: HOSPITAL | Age: 71
End: 2021-01-12

## 2021-01-12 DIAGNOSIS — Z51.81 THERAPEUTIC DRUG MONITORING: ICD-10-CM

## 2021-01-12 DIAGNOSIS — Z51.81 THERAPEUTIC DRUG MONITORING: Primary | ICD-10-CM

## 2021-01-12 LAB — PHENYTOIN SERPL-MCNC: 23.7 MCG/ML (ref 10–20)

## 2021-01-12 PROCEDURE — 36415 COLL VENOUS BLD VENIPUNCTURE: CPT

## 2021-01-12 PROCEDURE — 80185 ASSAY OF PHENYTOIN TOTAL: CPT

## 2021-01-12 NOTE — TELEPHONE ENCOUNTER
Per wife, pt has been more confused the last few days. Wife sts she set his medications out q day and he has been forgetting to take them. Pt has currently be diagnosed with C.Diff.. Current medications include:  Prednisone - started 12/31/20  Vancomycin 1254 mg qid - started 01/04/21  Levothyroxine 75 mcg q other day; 50 mcg days between  Phenytoin 100 mg am and 200 mg pm  Omeprazole 20 mg  Mesalamine 1.2 g 1 tab bid (directions are 2 tab bid but, due to cost only takes 1 bid)

## 2021-01-12 NOTE — TELEPHONE ENCOUNTER
Caller: ANTOINE    Relationship: SPOUSE    Best call back number: [phone] OR  [phone]    What orders are you requesting (i.e. lab or imaging): LABS TO CHECK DILANTIN LEVELS    In what timeframe would the patient need to come in: ANY TIME    Where will you receive your lab/imaging services: [facility]    Additional notes: SHE STATES PT HAS BEEN MORE CONFUSED RECENTLY . SHE STATES PT IS ON SEVERAL MEDICATIONS AND WANT TO KNOW IF HIS DILANTIN HAS BEEN AFFECTED. PLEASE ADVISE.

## 2021-01-12 NOTE — TELEPHONE ENCOUNTER
Called to discuss patient's wife's concerns. She is requesting dilantin level be ordered d/t concern for seizure despite any seizure-like activity.   Present to the emergency department for:   ·  fever greater than 105 F uncontrolled by ibuprofen and Tylenol  ·  respiratory distress,  ·  uncontrolled vomiting,  ·  unable to tolerate oral hydration,  ·  change in mental status,  ·  or as needed with emergent concerns    Wife verbalizes understanding and agrees w/ plan.

## 2021-01-14 ENCOUNTER — TELEPHONE (OUTPATIENT)
Dept: NEUROLOGY | Facility: CLINIC | Age: 71
End: 2021-01-14

## 2021-01-14 NOTE — TELEPHONE ENCOUNTER
Attempted to reach patient to evaluate for seizure like activity or any issues due to Dilantin being held.  Left message to call back.

## 2021-01-15 ENCOUNTER — TELEPHONE (OUTPATIENT)
Dept: NEUROLOGY | Facility: CLINIC | Age: 71
End: 2021-01-15

## 2021-01-15 ENCOUNTER — LAB (OUTPATIENT)
Dept: LAB | Facility: HOSPITAL | Age: 71
End: 2021-01-15

## 2021-01-15 DIAGNOSIS — G40.209 PARTIAL SYMPTOMATIC EPILEPSY WITH COMPLEX PARTIAL SEIZURES, NOT INTRACTABLE, WITHOUT STATUS EPILEPTICUS (HCC): Primary | ICD-10-CM

## 2021-01-15 DIAGNOSIS — G40.209 PARTIAL SYMPTOMATIC EPILEPSY WITH COMPLEX PARTIAL SEIZURES, NOT INTRACTABLE, WITHOUT STATUS EPILEPTICUS (HCC): ICD-10-CM

## 2021-01-15 DIAGNOSIS — R56.9 GENERALIZED CONVULSIVE SEIZURES (HCC): Primary | ICD-10-CM

## 2021-01-15 LAB — PHENYTOIN SERPL-MCNC: 12.3 MCG/ML (ref 10–20)

## 2021-01-15 PROCEDURE — 36415 COLL VENOUS BLD VENIPUNCTURE: CPT

## 2021-01-15 PROCEDURE — 80185 ASSAY OF PHENYTOIN TOTAL: CPT

## 2021-01-15 NOTE — TELEPHONE ENCOUNTER
Dilantin level rechecked today - 12.3.     Discussed with ORA Byrd who advises that patient take Dilantin 200mg qhs and repeat level in 7-10 days.    Spoke with patient's wife who verbalized understanding.  Lab order placed.

## 2021-01-25 ENCOUNTER — LAB (OUTPATIENT)
Dept: LAB | Facility: HOSPITAL | Age: 71
End: 2021-01-25

## 2021-01-25 ENCOUNTER — TELEPHONE (OUTPATIENT)
Dept: NEUROLOGY | Facility: CLINIC | Age: 71
End: 2021-01-25

## 2021-01-25 DIAGNOSIS — R56.9 GENERALIZED CONVULSIVE SEIZURES (HCC): ICD-10-CM

## 2021-01-25 LAB — PHENYTOIN SERPL-MCNC: 8 MCG/ML (ref 10–20)

## 2021-01-25 PROCEDURE — 36415 COLL VENOUS BLD VENIPUNCTURE: CPT

## 2021-01-25 PROCEDURE — 80185 ASSAY OF PHENYTOIN TOTAL: CPT

## 2021-01-25 NOTE — TELEPHONE ENCOUNTER
Attempted to call patient's wife with results and recommendations.  Call went straight to voicemal.  Left message to call back.

## 2021-01-25 NOTE — TELEPHONE ENCOUNTER
----- Message from ORA Ugarte sent at 1/25/2021  4:20 PM EST -----  Continue 200mg QHS and add 100mg QOD.

## 2021-01-26 ENCOUNTER — TELEPHONE (OUTPATIENT)
Dept: NEUROLOGY | Facility: CLINIC | Age: 71
End: 2021-01-26

## 2021-01-26 NOTE — TELEPHONE ENCOUNTER
Attempted to reach patient/spouse to discuss Dilantin level and recommendations again.  Called [phone] and call went straight to voicemail again.  Tried [phone] to reach patient's wife and call rang and never went to voicemail.

## 2021-01-26 NOTE — TELEPHONE ENCOUNTER
Spoke with patient's wife regarding results and recommendations per ORA Byrd.  Patient's wife repeated order and voiced understanding.  They will call with questions or if patient has any seizures or seizure-like activity.

## 2021-01-26 NOTE — TELEPHONE ENCOUNTER
----- Message from Dmitri Alberto sent at 1/26/2021  2:25 PM EST -----  Regarding: Test Results Question  Contact: [phone]  My Dilantin level is 8.  Norms are 10-20.  What to do?

## 2021-02-23 ENCOUNTER — OFFICE VISIT (OUTPATIENT)
Dept: NEUROLOGY | Facility: CLINIC | Age: 71
End: 2021-02-23

## 2021-02-23 VITALS
HEIGHT: 70 IN | DIASTOLIC BLOOD PRESSURE: 90 MMHG | WEIGHT: 122 LBS | BODY MASS INDEX: 17.47 KG/M2 | SYSTOLIC BLOOD PRESSURE: 120 MMHG | OXYGEN SATURATION: 99 % | HEART RATE: 86 BPM

## 2021-02-23 DIAGNOSIS — R56.9 GENERALIZED CONVULSIVE SEIZURES (HCC): ICD-10-CM

## 2021-02-23 DIAGNOSIS — E43 SEVERE MALNUTRITION (HCC): ICD-10-CM

## 2021-02-23 DIAGNOSIS — G40.209 PARTIAL SYMPTOMATIC EPILEPSY WITH COMPLEX PARTIAL SEIZURES, NOT INTRACTABLE, WITHOUT STATUS EPILEPTICUS (HCC): Primary | ICD-10-CM

## 2021-02-23 PROCEDURE — 99214 OFFICE O/P EST MOD 30 MIN: CPT | Performed by: NURSE PRACTITIONER

## 2021-02-23 RX ORDER — PHENYTOIN SODIUM 100 MG/1
100 CAPSULE, EXTENDED RELEASE ORAL EVERY OTHER DAY
COMMUNITY

## 2021-02-23 RX ORDER — PREDNISONE 10 MG/1
TABLET ORAL
COMMUNITY
Start: 2020-12-30

## 2021-02-23 RX ORDER — ATORVASTATIN CALCIUM 20 MG/1
20 TABLET, FILM COATED ORAL DAILY
COMMUNITY
Start: 2020-12-02 | End: 2021-12-02

## 2021-02-23 NOTE — PROGRESS NOTES
"CC: Seizure and concern for cognitive impairment    HPI:  Dmitri Alberto is a  70 y.o.  right-handed male seen today in follow-up for above chief complaints.    Patient was last seen by me 8/18/2020 for the same.  At that time, he denied any seizure like activity.  He reportedly has been seizure-free since 2012 according to wife; patient thinks \"it has been longer than that\".  He remained on Dilantin.  He denied any new complaints and or concerns at that time.  Therefore no medication changes were made.  Today, patient denies any true seizures.  Dilantin level has been erratic wife reported some change in mental status therefore Dilantin level was ordered 1/12; 23.7.  Repeated 1/1512.3 and again 1/25 8.0.  Patient is unsure of his current medication dosing for Dilantin therefore I will reach out to his wife to confirm.     During last appointment there had been prior MMSE completed due to concern for cognitive impairment which patient declined.  Last MMSE was 29/30.  Clock draw 4/4 and animal naming 11 animals in 1 minute.  Patient had previously completed neuropsych testing in February 2018 which showed possibility of frontotemporal dementia.  Although patient is unaccompanied to today's visit he is willing to repeat MMSE which shows 26/30; with difficulty spelling the word world backwards and/or subtracting 7 from 100.  Clock draw 4/4.  Animal naming 10 animals in 1 minute; prior clock draw 4/4 and animal naming 11 animals in 1 minute.  Patient reports he has had some \"GI issues\" for approximately 6 months with some rectal bleeding and is being followed as an outpatient for this.  He denies any weight loss; 2 pound weight gain since last neurology visit.  He denies any other complaints and or concerns on my exam.  I will plan to see him back in 6 months time; sooner if needed.         Past Medical History:   Diagnosis Date   • Brain bleed (CMS/HCC)     reported by spouse, ocurred in 2011   • Disorder of lumbar " spine    • Diverticulitis    • Generalized convulsive seizures (CMS/HCC) 2/6/2018   • Panic attacks    • Partial symptomatic epilepsy with complex partial seizures, not intractable, without status epilepticus (CMS/HCC) 2/6/2017   • Seizures (CMS/HCC)    • Sleep apnea    • Thyroid disorder          Past Surgical History:   Procedure Laterality Date   • HAND SURGERY     • HERNIA REPAIR     • SHOULDER SURGERY             Current Outpatient Medications:   •  atorvastatin (LIPITOR) 20 MG tablet, Take 20 mg by mouth Daily., Disp: , Rfl:   •  Calcium-Magnesium-Vitamin D 500-250-200 MG-MG-UNIT tablet, Take  by mouth., Disp: , Rfl:   •  Cholecalciferol (VITAMIN D3) 5000 units tablet tablet, Take  by mouth., Disp: , Rfl:   •  Cobalamine Combinations (B-12) 100-5000 MCG sublingual tablet, , Disp: , Rfl:   •  hydrOXYzine (ATARAX) 25 MG tablet, Take 25 mg by mouth., Disp: , Rfl:   •  levothyroxine (SYNTHROID, LEVOTHROID) 50 MCG tablet, Take  by mouth Daily., Disp: , Rfl:   •  levothyroxine (SYNTHROID, LEVOTHROID) 75 MCG tablet, Take 75 mcg by mouth Daily., Disp: , Rfl: 3  •  LIALDA 1.2 G EC tablet, , Disp: , Rfl:   •  MAGNESIUM PO, Take  by mouth., Disp: , Rfl:   •  omeprazole (priLOSEC) 20 MG capsule, Take 20 mg by mouth Every Morning., Disp: , Rfl: 11  •  oxyCODONE-acetaminophen (PERCOCET)  MG per tablet, Take 1 tablet by mouth., Disp: , Rfl:   •  phenytoin (DILANTIN) 100 MG ER capsule, 100mg in am and 200mg qhs, Disp: 90 capsule, Rfl: 3  •  predniSONE (DELTASONE) 10 MG tablet, TAKE 4 TABS BY MOUTH DAILY X 10 DAYS 3 TABS X 10 DAYS 2 TABS X 10 DAYS 1 TAB X 10 DAYS 1 2 TAB X 10 DAYS THEN OFF, Disp: , Rfl:       Family History   Problem Relation Age of Onset   • Stroke Mother    • Seizures Mother    • Hypertension Mother    • Thyroid disease Mother    • Stroke Father    • Bleeding Disorder Father    • No Known Problems Maternal Grandmother    • No Known Problems Maternal Grandfather    • No Known Problems Paternal  "Grandmother    • No Known Problems Paternal Grandfather          Social History     Socioeconomic History   • Marital status:      Spouse name: Not on file   • Number of children: Not on file   • Years of education: Not on file   • Highest education level: Not on file   Tobacco Use   • Smoking status: Former Smoker   • Smokeless tobacco: Never Used   Substance and Sexual Activity   • Alcohol use: No   • Drug use: No   • Sexual activity: Defer         Allergies   Allergen Reactions   • No Known Drug Allergy          Pain Scale:0/10        ROS:  Review of Systems   Constitutional: Negative for activity change, appetite change and unexpected weight change.   HENT: Negative for facial swelling, trouble swallowing and voice change.    Eyes: Negative for photophobia, pain and visual disturbance.   Respiratory: Negative for chest tightness, shortness of breath and wheezing.    Cardiovascular: Negative for chest pain, palpitations and leg swelling.   Gastrointestinal: Negative for abdominal pain, nausea and vomiting.   Endocrine: Negative for polydipsia and polyphagia.   Musculoskeletal: Negative for arthralgias, back pain, gait problem, joint swelling, myalgias, neck pain and neck stiffness.   Neurological: Negative for dizziness, tremors, seizures, syncope, facial asymmetry, speech difficulty, weakness, light-headedness, numbness and headaches.   Hematological: Does not bruise/bleed easily.   Psychiatric/Behavioral: Negative for agitation, behavioral problems, confusion, decreased concentration, dysphoric mood, hallucinations, self-injury, sleep disturbance and suicidal ideas. The patient is not nervous/anxious and is not hyperactive.            Physical Exam:  Vitals:    02/23/21 1110   BP: 120/90   BP Location: Left arm   Patient Position: Sitting   Cuff Size: Adult   Pulse: 86   SpO2: 99%   Weight: 55.3 kg (122 lb)   Height: 177.8 cm (70\")     Body mass index is 17.51 kg/m².    Physical Exam    General: Thin; " underweight male (BMI 17.5)   Head: Head is erect and midline: skull is normocephalic, symmetrical and smooth without deformity. Facial features are symmetrical;   Neck:  Trachea is midline; no JVD.   Eyes: conjunctiva pink; sclera white; PERRL. No tearing noted. Pupils constricted 4mm to 2mm   Ears:  Normal position.  Chest:  The trachea is midline. The chest is normal in appearance. The chest is clear to percussion and auscultation.  Heart:  HR 86 beats per minute, S1 and S2 noted with regular rhythm. /90. No abnormal jugular venous distention. No rubs, murmurs, or gallops noted upon auscultation.   Musculoskeletal:  The extremities are normal in color, size, and temperature. All pulses in the upper and lower extremities are grade II and equal. No clubbing, cyanosis, or edema is present.   Neurological:  Alert, relaxed, cooperative. Thought process coherent. Oriented to person, place and time. CN II- XII intact. Good muscle bulk and tone. Strength 5/5 throughout. Cerebellar: Rapid alternating movements, finger-to-nose, heel-to-shin intact. Gait with normal base. Sensory:  light touch  intact. Reflexes:  plantar reflexes downgoing.      MMSE 26/30  Clock draw 4/4  Animal naming 10 animals in 1 minute          Results:      Lab Results   Component Value Date    GLUCOSE 107 (H) 07/30/2018    BUN 15 08/11/2020    CREATININE 0.9 08/11/2020    EGFRIFNONA 118 07/30/2018    BCR 17.0 08/11/2020    CO2 30 08/11/2020    CALCIUM 9.6 08/11/2020    ALBUMIN 4.3 08/11/2020    LABIL2 1.0 (L) 08/11/2020    AST 18 08/11/2020    ALT 10 (L) 08/11/2020       Lab Results   Component Value Date    WBC 7.31 12/01/2020    HGB 14.9 12/01/2020    HCT 46.4 12/01/2020    MCV 99.6 12/01/2020     12/01/2020         .No results found for: RPR      Lab Results   Component Value Date    TSH 3.000 12/01/2020         Lab Results   Component Value Date    WRSCPPFA69 893 (H) 12/01/2020         No results found for: FOLATE      Lab Results    Component Value Date    HGBA1C 5.1 10/15/2019               Assessment:   1.  Partial symptomatic epilepsy with complex partial seizures not intractable without status epilepticus  2.  Generalized convulsive seizures  3.  Mild cognitive impairment; MMSE 26/30            Plan:  · Continue Dilantin 200mg QHS; 100mg QOD   · Follow-up with me in 6 months    Called wife via telephone to confirm medication    Diagnoses and all orders for this visit:    1. Partial symptomatic epilepsy with complex partial seizures, not intractable, without status epilepticus (CMS/HCC) (Primary)    2. Generalized convulsive seizures (CMS/HCC)    3. Severe malnutrition (CMS/HCC)                                    Dictated utilizing Dragon dictation.

## 2021-02-26 ENCOUNTER — TELEPHONE (OUTPATIENT)
Dept: NEUROLOGY | Facility: CLINIC | Age: 71
End: 2021-02-26

## 2021-02-26 DIAGNOSIS — G40.209 PARTIAL SYMPTOMATIC EPILEPSY WITH COMPLEX PARTIAL SEIZURES, NOT INTRACTABLE, WITHOUT STATUS EPILEPTICUS (HCC): Primary | ICD-10-CM

## 2021-02-26 DIAGNOSIS — R56.9 GENERALIZED CONVULSIVE SEIZURES (HCC): ICD-10-CM

## 2021-02-26 RX ORDER — PHENYTOIN SODIUM 100 MG/1
CAPSULE, EXTENDED RELEASE ORAL
Qty: 90 CAPSULE | Refills: 3 | Status: CANCELLED | OUTPATIENT
Start: 2021-02-26

## 2021-02-26 RX ORDER — PHENYTOIN SODIUM 100 MG/1
CAPSULE, EXTENDED RELEASE ORAL
Qty: 90 CAPSULE | Refills: 3 | Status: SHIPPED | OUTPATIENT
Start: 2021-02-26

## 2021-03-01 NOTE — TELEPHONE ENCOUNTER
Provider: GLEN VALDOVINOS    Caller: ANTOINE YIN    Relationship to Patient: WIFE    Pharmacy: [facility]    Phone Number: [phone]    Reason for Call: THE PT'S WIFE WAS CALLING TO SEE IF THE PT'S PRESCRIPTION REFILL FOR PHENYTOIN WAS ORDERED. I NOTIFIED THEM THAT IT LOOKS LIKE IT WAS ORDERED ON 2/26. PLEASE GIVE THEM A CALL BACK TO DISCUSS THIS.

## 2021-03-09 DIAGNOSIS — Z23 IMMUNIZATION DUE: ICD-10-CM

## 2021-03-23 ENCOUNTER — HOSPITAL ENCOUNTER (EMERGENCY)
Facility: HOSPITAL | Age: 71
End: 2021-03-23
Attending: EMERGENCY MEDICINE | Admitting: EMERGENCY MEDICINE

## 2021-03-23 ENCOUNTER — APPOINTMENT (OUTPATIENT)
Dept: GENERAL RADIOLOGY | Facility: HOSPITAL | Age: 71
End: 2021-03-23

## 2021-03-23 ENCOUNTER — HOSPITAL ENCOUNTER (EMERGENCY)
Facility: HOSPITAL | Age: 71
End: 2021-03-23

## 2021-03-23 VITALS — DIASTOLIC BLOOD PRESSURE: 25 MMHG | SYSTOLIC BLOOD PRESSURE: 45 MMHG | HEART RATE: 39 BPM | OXYGEN SATURATION: 74 %

## 2021-03-23 DIAGNOSIS — S27.0XXA TRAUMATIC PNEUMOTHORAX, INITIAL ENCOUNTER: ICD-10-CM

## 2021-03-23 DIAGNOSIS — I49.01 VENTRICULAR FIBRILLATION (HCC): ICD-10-CM

## 2021-03-23 DIAGNOSIS — I46.9 CARDIAC ARREST (HCC): Primary | ICD-10-CM

## 2021-03-23 PROCEDURE — 31500 INSERT EMERGENCY AIRWAY: CPT

## 2021-03-23 PROCEDURE — 99284 EMERGENCY DEPT VISIT MOD MDM: CPT

## 2021-03-23 PROCEDURE — 25010000002 EPINEPHRINE 1 MG/ML SOLUTION 30 ML VIAL: Performed by: EMERGENCY MEDICINE

## 2021-03-23 PROCEDURE — 25010000002 EPINEPHRINE 1 MG/10ML SOLUTION PREFILLED SYRINGE: Performed by: EMERGENCY MEDICINE

## 2021-03-23 PROCEDURE — 71045 X-RAY EXAM CHEST 1 VIEW: CPT

## 2021-03-23 PROCEDURE — 25010000002 AMIODARONE IN DEXTROSE 5% 360-4.14 MG/200ML-% SOLUTION: Performed by: EMERGENCY MEDICINE

## 2021-03-23 PROCEDURE — 92950 HEART/LUNG RESUSCITATION CPR: CPT

## 2021-03-23 PROCEDURE — 25010000002 ATROPINE PER 0.01 MG: Performed by: EMERGENCY MEDICINE

## 2021-03-23 PROCEDURE — 25010000002 AMIODARONE PER 30 MG: Performed by: EMERGENCY MEDICINE

## 2021-03-23 PROCEDURE — 94799 UNLISTED PULMONARY SVC/PX: CPT

## 2021-03-23 RX ORDER — AMIODARONE HYDROCHLORIDE 50 MG/ML
INJECTION, SOLUTION INTRAVENOUS
Status: COMPLETED | OUTPATIENT
Start: 2021-03-23 | End: 2021-03-23

## 2021-03-23 RX ORDER — NOREPINEPHRINE BIT/0.9 % NACL 8 MG/250ML
INFUSION BOTTLE (ML) INTRAVENOUS
Status: DISCONTINUED
Start: 2021-03-23 | End: 2021-03-23 | Stop reason: HOSPADM

## 2021-03-23 RX ORDER — CALCIUM CHLORIDE 100 MG/ML
INJECTION INTRAVENOUS; INTRAVENTRICULAR
Status: COMPLETED | OUTPATIENT
Start: 2021-03-23 | End: 2021-03-23

## 2021-03-23 RX ORDER — ATROPINE SULFATE 1 MG/ML
INJECTION, SOLUTION INTRAMUSCULAR; INTRAVENOUS; SUBCUTANEOUS
Status: COMPLETED | OUTPATIENT
Start: 2021-03-23 | End: 2021-03-23

## 2021-03-23 RX ORDER — SODIUM CHLORIDE 9 MG/ML
INJECTION, SOLUTION INTRAVENOUS
Status: COMPLETED | OUTPATIENT
Start: 2021-03-23 | End: 2021-03-23

## 2021-03-23 RX ORDER — EPINEPHRINE 0.1 MG/ML
SYRINGE (ML) INJECTION
Status: COMPLETED | OUTPATIENT
Start: 2021-03-23 | End: 2021-03-23

## 2021-03-23 RX ADMIN — EPINEPHRINE 1 MG: 0.1 INJECTION INTRACARDIAC; INTRAVENOUS at 03:35

## 2021-03-23 RX ADMIN — AMIODARONE HYDROCHLORIDE 150 MG: 50 INJECTION, SOLUTION INTRAVENOUS at 03:07

## 2021-03-23 RX ADMIN — EPINEPHRINE 1 MG: 0.1 INJECTION INTRACARDIAC; INTRAVENOUS at 03:14

## 2021-03-23 RX ADMIN — EPINEPHRINE 1 MG: 0.1 INJECTION INTRACARDIAC; INTRAVENOUS at 03:05

## 2021-03-23 RX ADMIN — EPINEPHRINE 1 MG: 0.1 INJECTION INTRACARDIAC; INTRAVENOUS at 03:01

## 2021-03-23 RX ADMIN — ATROPINE SULFATE 1 MG: 1 INJECTION, SOLUTION INTRAMUSCULAR; INTRAVENOUS; SUBCUTANEOUS at 03:40

## 2021-03-23 RX ADMIN — ATROPINE SULFATE 1 MG: 1 INJECTION, SOLUTION INTRAMUSCULAR; INTRAVENOUS; SUBCUTANEOUS at 03:27

## 2021-03-23 RX ADMIN — EPINEPHRINE 0.3 MCG/KG/MIN: 1 INJECTION PARENTERAL at 03:26

## 2021-03-23 RX ADMIN — SODIUM CHLORIDE 1000 ML: 9 INJECTION, SOLUTION INTRAVENOUS at 03:15

## 2021-03-23 RX ADMIN — CALCIUM CHLORIDE 1 G: 100 INJECTION, SOLUTION INTRAVENOUS at 03:07

## 2021-03-23 RX ADMIN — AMIODARONE HYDROCHLORIDE 1 MG/MIN: 1.8 INJECTION, SOLUTION INTRAVENOUS at 03:28

## 2021-03-23 RX ADMIN — EPINEPHRINE 1 MG: 0.1 INJECTION INTRACARDIAC; INTRAVENOUS at 03:32

## 2021-03-23 RX ADMIN — EPINEPHRINE 1 MG: 0.1 INJECTION INTRACARDIAC; INTRAVENOUS at 03:38

## 2021-03-23 RX ADMIN — EPINEPHRINE 1 MG: 0.1 INJECTION INTRACARDIAC; INTRAVENOUS at 03:11

## 2021-03-23 NOTE — ED NOTES
All staff wore full protective equipment throughout this patient encounter including a face mask, goggles, and gloves. Hand hygiene was performed before donning protective equipment and after removal when leaving the room.       Sravani Huber RN  03/23/21 0576

## 2021-03-23 NOTE — ED TRIAGE NOTES
Pt to ER from home via [facility] incident # [id number].    Pt had witnessed cardiac arrest while at home. EMS arrived to find CPR in progress.     Pt received x3 epi and x3 shocks while en route. ROSC was briefly achieved. Pt then went back into V fib.    Pt arrives being ventilated with BVM with jack device on.

## 2021-03-23 NOTE — ED PROVIDER NOTES
EMERGENCY DEPARTMENT ENCOUNTER    Room Number:  15/15  Date of encounter:  3/23/2021  PCP: Pranay Sherwood  Historian: EMS      HPI:  Chief Complaint: Cardiac arrest  A complete HPI/ROS/PMH/PSH/SH/FH are unobtainable due to: Patient in cardiac arrest    Context: Dmitri Alberto is a 70 y.o. male who presents to the ED -patient had a witnessed collapse with family was found to be pulseless.  Patient received CPR per family and then EMS.  EMS placed a IJ LE airway and states that patient was in V. fib.  Patient was defibrillated 3 times and given epi x3 prior to arrival in the ED.  Patient had brief spell of ROSC which then rapidly became V. fib again.  Patient arrives with bag-valve-mask ventilation and Sandeep device.  Patient was initially in V. fib was defibrillated patient was given calcium amiodarone and epinephrine.ROSC was briefly achieved patient then became bradycardic and pulse was lost.  CPR was continued.  Patient was intubated with a glide scope.  CPR was continued and it was noted that patient had obvious broken ribs and flail segment to his right chest.  Needle decompression was achieved.  Patient was in PEA continue to receive epinephrine.  ROSC was achieved.  Epi drip was started patient's pulse was then lost at that point I discussed wishes and outcomes with the family.  In the interim patient's pulse returned.  I spoke with the patient's family again and patient's family elected to pursue comfort measures only.      PAST MEDICAL HISTORY  Active Ambulatory Problems     Diagnosis Date Noted   • No Active Ambulatory Problems     Resolved Ambulatory Problems     Diagnosis Date Noted   • No Resolved Ambulatory Problems     No Additional Past Medical History         PAST SURGICAL HISTORY  No past surgical history on file.      FAMILY HISTORY  No family history on file.      SOCIAL HISTORY  Social History     Socioeconomic History   • Marital status:      Spouse name: Not on file   • Number of  children: Not on file   • Years of education: Not on file   • Highest education level: Not on file         ALLERGIES  Patient has no allergy information on record.        REVIEW OF SYSTEMS  Review of Systems     All systems reviewed and negative except for those discussed in HPI.       PHYSICAL EXAM    I have reviewed the triage vital signs and nursing notes.    ED Triage Vitals   Temp Heart Rate Resp BP SpO2   -- 03/23/21 0307 -- 03/23/21 0307 03/23/21 0312    131  112/46 (!) 73 %      Temp src Heart Rate Source Patient Position BP Location FiO2 (%)   -- -- -- -- --              Physical Exam  GENERAL: Responsive and pulseless  HENT: nares patent  EYES: no scleral icterus  CV: Pulseless   RESPIRATORY: Apneic, respirations per bag-valve-mask  ABDOMEN: soft, nontender nondistended  MUSCULOSKELETAL: no deformity  NEURO: GCS 3 T  SKIN: Cool        LAB RESULTS  No results found for this or any previous visit (from the past 24 hour(s)).    Ordered the above labs and independently reviewed the results.        RADIOLOGY  XR Chest 1 View    Result Date: 3/23/2021  SINGLE VIEW OF THE CHEST  HISTORY: Tube placement  COMPARISON: None available.  FINDINGS: Endotracheal tube terminates in satisfactory addition. Extensive bilateral alveolar and interstitial infiltrates are seen. The patient does have a right-sided pneumothorax. Multiple right-sided rib fractures are present. There is subcutaneous air in the right lateral chest wall. There are changes of prior vertebroplasty. Heart size is within normal limits for portable technique.       1. Endotracheal tube terminates in satisfactory position. 2. Bilateral alveolar and interstitial infiltrates. Appearance is nonspecific, but may represent edema. 3. Right-sided pneumothorax, as well as subcutaneous edema and multiple right-sided rib fractures.  FINDINGS were relayed to Physician Assistant Justin Sinha at 3:42 AM.  This report was finalized on 3/23/2021 3:42 AM by Dr. Delgado  FIONA Motley ordered the above noted radiological studies. Reviewed by me and discussed with radiologist.  See dictation for official radiology interpretation.      PROCEDURES    Critical Care  Performed by: Bravo Sanz MD  Authorized by: Bravo Sanz MD     Critical care provider statement:     Critical care time (minutes):  33    Critical care was necessary to treat or prevent imminent or life-threatening deterioration of the following conditions:  Cardiac failure    Critical care was time spent personally by me on the following activities:  Development of treatment plan with patient or surrogate, evaluation of patient's response to treatment, examination of patient, ordering and performing treatments and interventions, pulse oximetry and re-evaluation of patient's condition  Intubation    Date/Time: 3/23/2021 6:18 AM  Performed by: Bravo Sanz MD  Authorized by: Bravo Sanz MD     Consent:     Consent obtained:  Emergent situation  Pre-procedure details:     Patient status:  Unresponsive    Mallampati score:  I    Pretreatment medications:  None    Paralytics:  None  Procedure details:     Preoxygenation:  GEORGE/LMA    CPR in progress: yes      Intubation method:  Oral    Oral intubation technique:  Video-assisted    Laryngoscope blade:  Mac 3    Tube size (mm):  7.5    Tube type:  Cuffed    Number of attempts:  2    Ventilation between attempts: yes      Cricoid pressure: no      Tube visualized through cords: yes    Placement assessment:     ETT to lip:  23    Tube secured with:  ETT [place]    Breath sounds:  Equal    Placement verification: chest rise, CXR verification, equal breath sounds and ETCO2 detector      CXR findings:  ETT in proper place  Post-procedure details:     Patient tolerance of procedure:  Tolerated well, no immediate complications          MEDICATIONS GIVEN IN ER    Medications   amiodarone in dextrose 5% (NEXTERONE) 360-4.14  MG/200ML-% infusion  - ADS Override Pull (has no administration in time range)   amiodarone in dextrose 5% (NEXTERONE) 150-4.21 MG/100ML-% IVPB loading dose  - ADS Override Pull (has no administration in time range)   Norepinephrine-Sodium Chloride (LEVOPHED) 8-0.9 MG/250ML-% infusion solution  - ADS Override Pull (has no administration in time range)   Norepinephrine-Sodium Chloride (LEVOPHED) 8-0.9 MG/250ML-% infusion solution  - ADS Override Pull (has no administration in time range)   EPINEPHrine (ADRENALIN) injection (1 mg  Given 3/23/21 0314)   amiodarone (CORDARONE) injection (150 mg Intravenous Given 3/23/21 0307)   calcium chloride injection (1 g Intravenous Given 3/23/21 0307)   EPINEPHrine (ADRENALIN) 5,000 mcg in sodium chloride 0.9 % 250 mL infusion (0 mcg/kg/min × 55 kg (Order-Specific) Intravenous Stopped 3/23/21 0400)   atropine injection (1 mg Intravenous Given 3/23/21 0340)   amiodarone 360 mg in 200 mL D5W infusion (0 mg/min Intravenous Stopped 3/23/21 0420)   sodium chloride 0.9 % infusion (0 mL/hr Intravenous Stopped 3/23/21 0420)   EPINEPHrine (ADRENALIN) injection (1 mg  Given 3/23/21 0338)         PROGRESS, DATA ANALYSIS, CONSULTS, AND MEDICAL DECISION MAKING    All labs have been independently reviewed by me.  All radiology studies have been reviewed by me and discussed with radiologist dictating the report.   EKG's independently viewed and interpreted by me.  Discussion below represents my analysis of pertinent findings related to patient's condition, differential diagnosis, treatment plan and final disposition.        ED Course as of Mar 23 0620   Tue Mar 23, 2021   0354 I have discussed with patient's family and healthcare surrogate.  They state that they do not wish CPR to be started if the patient should lose his pulse.  They also requesting no pressors.  Also requesting that no chest tube be placed.  States that they wish for the ventilator to be turned off.  Expressed desire for  patient to be extubated.  We have discussed alternatives and outcomes and this is the plan I have agreed upon.    [TJ]   0402 Called to bedside: Patient is apneic and pulseless pronounced  at 0356.    [TJ]      ED Course User Index  [TJ] Bravo Sanz MD           PPE: Both the patient and I wore a surgical mask throughout the entire patient encounter. I wore protective goggles.     AS OF 06:20 EDT VITALS:    BP - (!) 45/25  HR - (!) 39  TEMP -    O2 SATS - (!) 74%        DIAGNOSIS  Final diagnoses:   Ventricular fibrillation (CMS/HCC)   Cardiac arrest (CMS/HCC)   Traumatic pneumothorax, initial encounter         DISPOSITION             Bravo Sanz MD  21 0620

## 2021-04-26 ENCOUNTER — TELEPHONE (OUTPATIENT)
Dept: NEUROLOGY | Facility: CLINIC | Age: 71
End: 2021-04-26

## 2021-04-26 NOTE — TELEPHONE ENCOUNTER
You could share last 2 office visits (progress notes) if needed otherwise I do not feel comfortable writing any kind of statement to that effect.  My note should speak for themselves.

## 2021-04-26 NOTE — TELEPHONE ENCOUNTER
Provider: GLEN VALDOVINOS    Caller: ANTOINE    Relationship to Patient: WIFE    Pharmacy: NA    Phone Number: [phone]    Reason for Call: PATIENTS WIFE STATES THAT SHE NEEDS A DOCTOR STATEMENT SUGGESTING THAT HE SHOULD NOT HAVE SIGNED ANY FORMS DUE TO HIS DECREASED COGNITIVE IMPAIRMENT. PLEASE ADVISE.    When was the patient last seen: 2-23-21

## 2021-04-26 NOTE — TELEPHONE ENCOUNTER
Pt is . Wife has called wanting a statement from you stating pt should not have signed any forms due to decreased cognitive impairment.     Thank you